# Patient Record
Sex: FEMALE | Race: WHITE | Employment: UNEMPLOYED | URBAN - METROPOLITAN AREA
[De-identification: names, ages, dates, MRNs, and addresses within clinical notes are randomized per-mention and may not be internally consistent; named-entity substitution may affect disease eponyms.]

---

## 2017-02-08 PROBLEM — Z31.69 ENCOUNTER FOR PRECONCEPTION CONSULTATION: Status: ACTIVE | Noted: 2017-02-08

## 2017-02-08 PROBLEM — E66.01 OBESITY, CLASS III, BMI 40-49.9 (MORBID OBESITY) (HCC): Status: ACTIVE | Noted: 2017-02-08

## 2017-02-08 PROBLEM — Z31.69 INFERTILITY COUNSELING: Status: ACTIVE | Noted: 2017-02-08

## 2017-03-31 PROBLEM — N93.9 ABNORMAL UTERINE BLEEDING (AUB): Status: ACTIVE | Noted: 2017-03-31

## 2018-04-07 ENCOUNTER — HOSPITAL ENCOUNTER (EMERGENCY)
Age: 33
Discharge: HOME | End: 2018-04-07
Attending: EMERGENCY MEDICINE
Payer: COMMERCIAL

## 2018-04-07 VITALS
HEART RATE: 110 BPM | SYSTOLIC BLOOD PRESSURE: 188 MMHG | TEMPERATURE: 98.2 F | BODY MASS INDEX: 45.31 KG/M2 | DIASTOLIC BLOOD PRESSURE: 103 MMHG | WEIGHT: 240 LBS | OXYGEN SATURATION: 97 % | RESPIRATION RATE: 16 BRPM | HEIGHT: 61 IN

## 2018-04-07 DIAGNOSIS — G89.29 ACUTE EXACERBATION OF CHRONIC LOW BACK PAIN: Primary | ICD-10-CM

## 2018-04-07 DIAGNOSIS — M54.50 ACUTE EXACERBATION OF CHRONIC LOW BACK PAIN: Primary | ICD-10-CM

## 2018-04-07 LAB
BACTERIA URNS QL MICRO: 0 /HPF
CASTS URNS QL MICRO: 0 /LPF
CRYSTALS URNS QL MICRO: NORMAL /LPF
EPI CELLS #/AREA URNS HPF: NORMAL /HPF
HCG SERPL-ACNC: 2013 MIU/ML (ref 0–6)
HCG UR QL: POSITIVE
MUCOUS THREADS URNS QL MICRO: NORMAL /LPF
OTHER OBSERVATIONS,UCOM: NORMAL
RBC #/AREA URNS HPF: NORMAL /HPF
WBC URNS QL MICRO: NORMAL /HPF

## 2018-04-07 PROCEDURE — 84702 CHORIONIC GONADOTROPIN TEST: CPT | Performed by: EMERGENCY MEDICINE

## 2018-04-07 PROCEDURE — 99284 EMERGENCY DEPT VISIT MOD MDM: CPT | Performed by: EMERGENCY MEDICINE

## 2018-04-07 PROCEDURE — 81003 URINALYSIS AUTO W/O SCOPE: CPT | Performed by: EMERGENCY MEDICINE

## 2018-04-07 PROCEDURE — 81025 URINE PREGNANCY TEST: CPT

## 2018-04-07 PROCEDURE — 81015 MICROSCOPIC EXAM OF URINE: CPT | Performed by: EMERGENCY MEDICINE

## 2018-04-07 NOTE — ED TRIAGE NOTES
PMD-[facility]. Pt c/o lower back pain since yesterday morning. Pt states that she bent over to wash her hair and when she raised up she felt pain. Hx of L5-S1 surgery. 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

## 2018-04-07 NOTE — ED NOTES
I have reviewed discharge instructions with the patient and spouse. The patient and spouse verbalized understanding. Patient left ED via Discharge Method: ambulatory to Home with self. Opportunity for questions and clarification provided. Patient given 0 scripts. Instructed to use icy/hot and tylenol. Instructed to follow up with OB as scheduled        To continue your aftercare when you leave the hospital, you may receive an automated call from our care team to check in on how you are doing. This is a free service and part of our promise to provide the best care and service to meet your aftercare needs.  If you have questions, or wish to unsubscribe from this service please call [phone]. Thank you for Choosing our [facility] Emergency Department.
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
## 2018-04-11 PROBLEM — M54.9 CHRONIC BACK PAIN: Status: ACTIVE | Noted: 2018-04-11

## 2018-04-11 PROBLEM — G89.29 CHRONIC BACK PAIN: Status: ACTIVE | Noted: 2018-04-11

## 2018-04-24 PROBLEM — Z31.69 ENCOUNTER FOR PRECONCEPTION CONSULTATION: Status: RESOLVED | Noted: 2017-02-08 | Resolved: 2018-04-24

## 2018-04-24 PROBLEM — Z34.90 PREGNANCY: Status: ACTIVE | Noted: 2018-04-24

## 2018-04-24 PROBLEM — D21.9 FIBROIDS: Status: ACTIVE | Noted: 2018-04-24

## 2018-04-24 PROBLEM — N93.9 ABNORMAL UTERINE BLEEDING (AUB): Status: RESOLVED | Noted: 2017-03-31 | Resolved: 2018-04-24

## 2018-04-24 PROBLEM — E28.2 PCOS (POLYCYSTIC OVARIAN SYNDROME): Status: ACTIVE | Noted: 2018-04-24

## 2018-04-24 PROBLEM — Z31.69 INFERTILITY COUNSELING: Status: RESOLVED | Noted: 2017-02-08 | Resolved: 2018-04-24

## 2018-04-24 PROBLEM — E03.9 ACQUIRED HYPOTHYROIDISM: Status: ACTIVE | Noted: 2018-04-24

## 2018-05-21 PROBLEM — Z83.49 FAMILY HISTORY OF MTHFR DEFICIENCY: Status: ACTIVE | Noted: 2018-05-21

## 2018-05-25 PROBLEM — Z36.82 NUCHAL TRANSLUCENCY OF FETUS ON PRENATAL ULTRASOUND: Status: ACTIVE | Noted: 2018-05-25

## 2018-05-25 PROBLEM — D25.9 UTERINE FIBROIDS AFFECTING PREGNANCY, ANTEPARTUM, FIRST TRIMESTER: Status: ACTIVE | Noted: 2018-04-24

## 2018-05-25 PROBLEM — Z82.79 FAMILY HISTORY OF CONGENITAL OR GENETIC CONDITION: Status: ACTIVE | Noted: 2018-05-25

## 2018-05-25 PROBLEM — O99.281 HYPOTHYROID IN PREGNANCY, ANTEPARTUM, FIRST TRIMESTER: Status: ACTIVE | Noted: 2018-04-24

## 2018-05-25 PROBLEM — O34.11 UTERINE FIBROIDS AFFECTING PREGNANCY, ANTEPARTUM, FIRST TRIMESTER: Status: ACTIVE | Noted: 2018-04-24

## 2018-05-25 PROBLEM — O99.891 BACK PAIN COMPLICATING PREGNANCY IN FIRST TRIMESTER: Status: ACTIVE | Noted: 2018-04-11

## 2018-05-25 PROBLEM — O99.211 OBESITY AFFECTING PREGNANCY IN FIRST TRIMESTER: Status: ACTIVE | Noted: 2017-02-08

## 2018-05-25 PROBLEM — O09.91 HIGH-RISK PREGNANCY IN FIRST TRIMESTER: Status: ACTIVE | Noted: 2018-04-24

## 2018-05-25 PROBLEM — O34.80 POLYCYSTIC OVARY AFFECTING PREGNANCY, ANTEPARTUM: Status: ACTIVE | Noted: 2018-04-24

## 2018-06-15 ENCOUNTER — HOSPITAL ENCOUNTER (EMERGENCY)
Age: 33
Discharge: HOME | End: 2018-06-15
Attending: EMERGENCY MEDICINE
Payer: COMMERCIAL

## 2018-06-15 VITALS
RESPIRATION RATE: 14 BRPM | OXYGEN SATURATION: 98 % | HEIGHT: 61 IN | TEMPERATURE: 98.2 F | BODY MASS INDEX: 38.71 KG/M2 | WEIGHT: 205 LBS | HEART RATE: 95 BPM | SYSTOLIC BLOOD PRESSURE: 112 MMHG | DIASTOLIC BLOOD PRESSURE: 58 MMHG

## 2018-06-15 DIAGNOSIS — R11.10 HYPEREMESIS: Primary | ICD-10-CM

## 2018-06-15 LAB
ALBUMIN SERPL-MCNC: 2.8 G/DL (ref 3.5–5)
ALBUMIN/GLOB SERPL: 0.5 {RATIO} (ref 1.2–3.5)
ALP SERPL-CCNC: 55 U/L (ref 50–136)
ALT SERPL-CCNC: 15 U/L (ref 12–65)
ANION GAP SERPL CALC-SCNC: 11 MMOL/L (ref 7–16)
AST SERPL-CCNC: 14 U/L (ref 15–37)
BASOPHILS # BLD: 0 K/UL (ref 0–0.2)
BASOPHILS NFR BLD: 0 % (ref 0–2)
BILIRUB SERPL-MCNC: 0.2 MG/DL (ref 0.2–1.1)
BUN SERPL-MCNC: 1 MG/DL (ref 6–23)
CALCIUM SERPL-MCNC: 9.5 MG/DL (ref 8.3–10.4)
CHLORIDE SERPL-SCNC: 101 MMOL/L (ref 98–107)
CO2 SERPL-SCNC: 23 MMOL/L (ref 21–32)
CREAT SERPL-MCNC: 0.61 MG/DL (ref 0.6–1)
DIFFERENTIAL METHOD BLD: ABNORMAL
EOSINOPHIL # BLD: 0.3 K/UL (ref 0–0.8)
EOSINOPHIL NFR BLD: 2 % (ref 0.5–7.8)
ERYTHROCYTE [DISTWIDTH] IN BLOOD BY AUTOMATED COUNT: 15.2 % (ref 11.9–14.6)
GLOBULIN SER CALC-MCNC: 5.1 G/DL (ref 2.3–3.5)
GLUCOSE SERPL-MCNC: 89 MG/DL (ref 65–100)
HCG UR QL: POSITIVE
HCT VFR BLD AUTO: 38.1 % (ref 35.8–46.3)
HGB BLD-MCNC: 12.1 G/DL (ref 11.7–15.4)
IMM GRANULOCYTES # BLD: 0 K/UL (ref 0–0.5)
IMM GRANULOCYTES NFR BLD AUTO: 0 % (ref 0–5)
LYMPHOCYTES # BLD: 4 K/UL (ref 0.5–4.6)
LYMPHOCYTES NFR BLD: 29 % (ref 13–44)
MCH RBC QN AUTO: 25.9 PG (ref 26.1–32.9)
MCHC RBC AUTO-ENTMCNC: 31.8 G/DL (ref 31.4–35)
MCV RBC AUTO: 81.6 FL (ref 79.6–97.8)
MONOCYTES # BLD: 0.8 K/UL (ref 0.1–1.3)
MONOCYTES NFR BLD: 6 % (ref 4–12)
NEUTS SEG # BLD: 8.4 K/UL (ref 1.7–8.2)
NEUTS SEG NFR BLD: 63 % (ref 43–78)
PLATELET # BLD AUTO: 315 K/UL (ref 150–450)
PMV BLD AUTO: 10.1 FL (ref 10.8–14.1)
POTASSIUM SERPL-SCNC: 3.8 MMOL/L (ref 3.5–5.1)
PROT SERPL-MCNC: 7.9 G/DL (ref 6.3–8.2)
RBC # BLD AUTO: 4.67 M/UL (ref 4.05–5.25)
SODIUM SERPL-SCNC: 135 MMOL/L (ref 136–145)
WBC # BLD AUTO: 13.6 K/UL (ref 4.3–11.1)

## 2018-06-15 PROCEDURE — 85025 COMPLETE CBC W/AUTO DIFF WBC: CPT | Performed by: EMERGENCY MEDICINE

## 2018-06-15 PROCEDURE — 81025 URINE PREGNANCY TEST: CPT

## 2018-06-15 PROCEDURE — 74011250636 HC RX REV CODE- 250/636: Performed by: EMERGENCY MEDICINE

## 2018-06-15 PROCEDURE — 81003 URINALYSIS AUTO W/O SCOPE: CPT | Performed by: EMERGENCY MEDICINE

## 2018-06-15 PROCEDURE — 80053 COMPREHEN METABOLIC PANEL: CPT | Performed by: EMERGENCY MEDICINE

## 2018-06-15 PROCEDURE — 99284 EMERGENCY DEPT VISIT MOD MDM: CPT | Performed by: EMERGENCY MEDICINE

## 2018-06-15 PROCEDURE — 96361 HYDRATE IV INFUSION ADD-ON: CPT | Performed by: EMERGENCY MEDICINE

## 2018-06-15 PROCEDURE — 96374 THER/PROPH/DIAG INJ IV PUSH: CPT | Performed by: EMERGENCY MEDICINE

## 2018-06-15 RX ORDER — ONDANSETRON 2 MG/ML
4 INJECTION INTRAMUSCULAR; INTRAVENOUS
Status: COMPLETED | OUTPATIENT
Start: 2018-06-15 | End: 2018-06-15

## 2018-06-15 RX ORDER — PROMETHAZINE HYDROCHLORIDE 25 MG/1
25 TABLET ORAL
Qty: 12 TAB | Refills: 0 | Status: SHIPPED | OUTPATIENT
Start: 2018-06-15 | End: 2018-06-20

## 2018-06-15 RX ADMIN — SODIUM CHLORIDE 1000 ML: 900 INJECTION, SOLUTION INTRAVENOUS at 20:43

## 2018-06-15 RX ADMIN — ONDANSETRON 4 MG: 2 INJECTION INTRAMUSCULAR; INTRAVENOUS at 20:44

## 2018-06-16 NOTE — ED PROVIDER NOTES
HPI Comments: Patient presents returned complaining of persistent nausea and vomiting and back pain. Also thinks she had a low-grade fever today as well. She is   With a history of a subchorionic hemorrhage as well and been having intermittent spotting but there is no change in the amount of bleeding at this time she has been having some intermittent suprapubic pain which she states she was told was related to round ligament pain. She has no syncope no chest pain or shortness of breath and review of systems otherwise negative. She has not noted any fetal motion as of yet with an estimated gestational age of about 14 weeks. Patient is a 29 y.o. female presenting with pregnancy problem. The history is provided by the patient. Pregnancy Problem    This is a new problem. The current episode started 12 to 24 hours ago. The problem occurs constantly. The problem has not changed since onset. The pain is associated with vomiting. The pain is located in the suprapubic region. The quality of the pain is cramping. The pain is at a severity of 6/10. The pain is moderate. Associated symptoms include nausea, vomiting, frequency and back pain. Pertinent negatives include no anorexia, no fever, no belching, no diarrhea, no hematochezia, no melena, no constipation, no hematuria, no headaches, no arthralgias and no myalgias. Nothing worsens the pain. The pain is relieved by nothing. The patient's surgical history non-contributory.        Past Medical History:   Diagnosis Date    Arthritis     joint pain to lumber nerve damage lft side    Bowel trouble     Calculus of kidney     possible kidney stones    Chronic pain     Endometriosis     Fibroids     Fractures     arms and hands    Headache     Hypothyroid     PCOS (polycystic ovarian syndrome)     Pneumonia        Past Surgical History:   Procedure Laterality Date    HX BREAST BIOPSY      HX COLONOSCOPY      HX HEENT      tonsils    HX LUMBAR DISKECTOMY L5-S1    HX TONSILLECTOMY      HX WISDOM TEETH EXTRACTION           Family History:   Problem Relation Age of Onset    Hypertension Father     Heart Disease Father     Breast Cancer Maternal Aunt     Breast Cancer Paternal Aunt     Diabetes Maternal Grandmother     Stroke Maternal Grandmother     Stroke Paternal Grandmother     Colon Cancer Paternal Grandmother     Breast Cancer Maternal Aunt     Kidney Disease Mother     Cancer Mother      Skin    Thyroid Disease Sister      hypothryoid    Suicide Brother      gun shot    Breast Cancer Other     Other Sister      MTHFR gene       Social History     Social History    Marital status:      Spouse name: N/A    Number of children: N/A    Years of education: N/A     Occupational History    Not on file. Social History Main Topics    Smoking status: Never Smoker    Smokeless tobacco: Never Used    Alcohol use No    Drug use: No    Sexual activity: Yes     Partners: Male     Birth control/ protection: None     Other Topics Concern    Not on file     Social History Narrative         ALLERGIES: Amoxicillin; Ceftin [cefuroxime axetil]; Oxycodone; Percocet [oxycodone-acetaminophen]; and Vicodin [hydrocodone-acetaminophen]    Review of Systems   Constitutional: Negative for fever. Gastrointestinal: Positive for nausea and vomiting. Negative for anorexia, constipation, diarrhea, hematochezia and melena. Genitourinary: Positive for frequency. Negative for hematuria. Musculoskeletal: Positive for back pain. Negative for arthralgias and myalgias. Neurological: Negative for headaches. All other systems reviewed and are negative. Vitals:    06/15/18 2048 06/15/18 2059 06/15/18 2115 06/15/18 2130   BP: 168/69 137/63 126/67 115/61   Pulse:       Resp:       Temp:       SpO2:       Weight:       Height:                Physical Exam   Constitutional: She is oriented to person, place, and time.  She appears well-developed and well-nourished. No distress. HENT:   Head: Normocephalic and atraumatic. Eyes: Conjunctivae and EOM are normal. Pupils are equal, round, and reactive to light. Neck: Normal range of motion. Neck supple. Cardiovascular: Normal rate, regular rhythm and normal heart sounds. Pulmonary/Chest: Effort normal and breath sounds normal.   Abdominal: Soft. Bowel sounds are normal.   Musculoskeletal: Normal range of motion. She exhibits no edema, tenderness or deformity. Neurological: She is alert and oriented to person, place, and time. Skin: Skin is warm and dry. Psychiatric: She has a normal mood and affect. Her behavior is normal.   Nursing note and vitals reviewed. MDM  Number of Diagnoses or Management Options  Diagnosis management comments: Bedside ultrasound confirms continued live intrauterine pregnancy       Amount and/or Complexity of Data Reviewed  Clinical lab tests: ordered and reviewed    Risk of Complications, Morbidity, and/or Mortality  Presenting problems: moderate  Diagnostic procedures: moderate  Management options: moderate    Patient Progress  Patient progress: stable        ED Course       Procedures      Case discussed with Dr. Timmy Velasquez was on-call for Dr. Jamel Varma. As the patient's blood pressure declined to normal level without treatment and there is no other indication of preeclampsia will treat symptomatically for the nausea and vomiting and he recommended Phenergan for further nausea vomiting control since the Diclegis the patient is taking does not seem to be working.

## 2018-06-16 NOTE — ED NOTES
I have reviewed discharge instructions with the patient. The patient verbalized understanding. Patient left ED via Discharge Method: ambulatory to Home with spouse. Opportunity for questions and clarification provided. Patient given 1 scripts. To continue your aftercare when you leave the hospital, you may receive an automated call from our care team to check in on how you are doing. This is a free service and part of our promise to provide the best care and service to meet your aftercare needs.  If you have questions, or wish to unsubscribe from this service please call [phone]. Thank you for Choosing our [facility] Emergency Department.

## 2018-07-23 PROBLEM — O99.282 HYPOTHYROID IN PREGNANCY, ANTEPARTUM, SECOND TRIMESTER: Status: ACTIVE | Noted: 2018-04-24

## 2018-07-23 PROBLEM — O09.92 HIGH-RISK PREGNANCY IN SECOND TRIMESTER: Status: ACTIVE | Noted: 2018-04-24

## 2018-07-23 PROBLEM — O99.212 OBESITY AFFECTING PREGNANCY IN SECOND TRIMESTER: Status: ACTIVE | Noted: 2017-02-08

## 2018-07-23 PROBLEM — O34.12 UTERINE FIBROIDS AFFECTING PREGNANCY, ANTEPARTUM, SECOND TRIMESTER: Status: ACTIVE | Noted: 2018-04-24

## 2018-07-31 ENCOUNTER — HOSPITAL ENCOUNTER (EMERGENCY)
Age: 33
Discharge: HOME | End: 2018-07-31
Attending: OBSTETRICS & GYNECOLOGY | Admitting: OBSTETRICS & GYNECOLOGY
Payer: COMMERCIAL

## 2018-07-31 VITALS
TEMPERATURE: 98.1 F | HEART RATE: 107 BPM | SYSTOLIC BLOOD PRESSURE: 139 MMHG | RESPIRATION RATE: 18 BRPM | BODY MASS INDEX: 47.58 KG/M2 | HEIGHT: 61 IN | DIASTOLIC BLOOD PRESSURE: 91 MMHG | WEIGHT: 252 LBS | OXYGEN SATURATION: 98 %

## 2018-07-31 DIAGNOSIS — O99.891 BACK PAIN COMPLICATING PREGNANCY IN SECOND TRIMESTER: Primary | ICD-10-CM

## 2018-07-31 DIAGNOSIS — M54.9 BACK PAIN COMPLICATING PREGNANCY IN SECOND TRIMESTER: Primary | ICD-10-CM

## 2018-07-31 PROBLEM — O26.893 ABDOMINAL PAIN DURING PREGNANCY IN THIRD TRIMESTER: Status: ACTIVE | Noted: 2018-07-31

## 2018-07-31 PROBLEM — R10.9 ABDOMINAL PAIN DURING PREGNANCY IN THIRD TRIMESTER: Status: ACTIVE | Noted: 2018-07-31

## 2018-07-31 LAB
APPEARANCE UR: ABNORMAL
BACTERIA URNS QL MICRO: 0 /HPF
BILIRUB UR QL: NEGATIVE
COLOR UR: YELLOW
EPI CELLS #/AREA URNS HPF: ABNORMAL /HPF
FIBRONECTIN FETAL VAG QL: NEGATIVE
GLUCOSE UR STRIP.AUTO-MCNC: NEGATIVE MG/DL
HGB UR QL STRIP: NEGATIVE
KETONES UR QL STRIP.AUTO: ABNORMAL MG/DL
LEUKOCYTE ESTERASE UR QL STRIP.AUTO: NEGATIVE
NITRITE UR QL STRIP.AUTO: NEGATIVE
PH UR STRIP: 5.5 [PH] (ref 5–9)
PROT UR STRIP-MCNC: ABNORMAL MG/DL
RBC #/AREA URNS HPF: ABNORMAL /HPF
SP GR UR REFRACTOMETRY: 1.02 (ref 1–1.02)
UROBILINOGEN UR QL STRIP.AUTO: 0.2 EU/DL (ref 0.2–1)
WBC URNS QL MICRO: ABNORMAL /HPF

## 2018-07-31 PROCEDURE — 75810000275 HC EMERGENCY DEPT VISIT NO LEVEL OF CARE: Performed by: OBSTETRICS & GYNECOLOGY

## 2018-07-31 PROCEDURE — 81003 URINALYSIS AUTO W/O SCOPE: CPT | Performed by: OBSTETRICS & GYNECOLOGY

## 2018-07-31 PROCEDURE — 99284 EMERGENCY DEPT VISIT MOD MDM: CPT

## 2018-07-31 PROCEDURE — 82731 ASSAY OF FETAL FIBRONECTIN: CPT | Performed by: OBSTETRICS & GYNECOLOGY

## 2018-07-31 RX ORDER — TRAMADOL HYDROCHLORIDE 50 MG/1
50 TABLET ORAL
Qty: 12 TAB | Refills: 0 | Status: SHIPPED | OUTPATIENT
Start: 2018-07-31 | End: 2018-10-23 | Stop reason: SDUPTHER

## 2018-07-31 NOTE — IP AVS SNAPSHOT
[address] 
[phone] Patient: Alessandra Steele MRN: [id number] :1985 A check abhinav indicates which time of day the medication should be taken. My Medications START taking these medications Instructions Each Dose to Equal  
 Morning Noon Evening Bedtime  
 traMADol 50 mg tablet Commonly known as:  ULTRAM  
   
Your last dose was: Your next dose is: Take 1 Tab by mouth every six (6) hours as needed for Pain. Max Daily Amount: 200 mg. Indications: Pain 50 mg CONTINUE taking these medications Instructions Each Dose to Equal  
 Morning Noon Evening Bedtime  
 aspirin delayed-release 81 mg tablet Your last dose was: Your next dose is: Take  by mouth daily. cyclobenzaprine 5 mg tablet Commonly known as:  FLEXERIL Your last dose was: Your next dose is: Take 1 Tab by mouth three (3) times daily as needed for Muscle Spasm(s). Indications: Muscle Spasm 5 mg  
    
   
   
   
  
 levothyroxine 75 mcg tablet Commonly known as:  SYNTHROID Your last dose was: Your next dose is: Take 1 Tab by mouth Daily (before breakfast). 75 mcg PNV-DHA PO Your last dose was: Your next dose is: Take  by mouth.  
     
   
   
   
  
 promethazine 25 mg tablet Commonly known as:  PHENERGAN Your last dose was: Your next dose is: Take 1 Tab by mouth every six (6) hours as needed for Nausea. 25 mg  
    
   
   
   
  
 TYLENOL 325 mg tablet Generic drug:  acetaminophen Your last dose was: Your next dose is: Take  by mouth every four (4) hours as needed for Pain. TYLENOL-CODEINE #3 300-30 mg per tablet Generic drug:  acetaminophen-codeine Your last dose was: Your next dose is: Take 1 Tab by mouth every four (4) hours as needed for Pain. 1 Tab VITAMIN D3 2,000 unit Tab Generic drug:  cholecalciferol (vitamin D3) Your last dose was: Your next dose is: Take  by mouth. ZANTAC 150 mg tablet Generic drug:  raNITIdine Your last dose was: Your next dose is: Take 150 mg by mouth two (2) times a day. 150 mg Where to Get Your Medications Information on where to get these meds will be given to you by the nurse or doctor. ! Ask your nurse or doctor about these medications  
  traMADol 50 mg tablet

## 2018-07-31 NOTE — PROGRESS NOTES
Dr Rubén Patel at the bedside and performed a SVE. Discharge discussed with pt and prescription pain medication discussed. Pt verbalizes understanding.

## 2018-07-31 NOTE — PROGRESS NOTES
Discharge instructions given: ptl precautions and what to expect at 22-26 weeks. RX given to pt by dr Jovani Gates. Pt verbalized understanding. Left unit ambulating.  at bs.

## 2018-07-31 NOTE — H&P
Chief Complaint Patient presents with  Pregnancy Problem  
 
 
29 y.o. female at 22w5d 
weeks gestation who is seen forback and abdominal pain, intermittent with no particular exacerbating/alleviating factors. Occurs randomly. Has had hx of back pain/back surgery. Has also been spotting throughout pregnancy presumably due to fibroids. No uti sx, fever, change in bm. HISTORY: 
OB History  Para Term  AB Living 1 0 0 0 0 0 SAB TAB Ectopic Molar Multiple Live Births  
0 0 0 0 0 0 # Outcome Date GA Lbr Bonifacio/2nd Weight Sex Delivery Anes PTL Lv  
1 Current History Sexual Activity  Sexual activity: Yes  Partners: Male  Birth control/ protection: None Patient's last menstrual period was 2018. Social History Social History  Marital status:  Spouse name: N/A  
 Number of children: N/A  
 Years of education: N/A Occupational History  Not on file. Social History Main Topics  Smoking status: Never Smoker  Smokeless tobacco: Never Used  Alcohol use No  
 Drug use: No  
 Sexual activity: Yes  
  Partners: Male Birth control/ protection: None Other Topics Concern  Not on file Social History Narrative Past Surgical History:  
Procedure Laterality Date  HX BREAST BIOPSY  HX COLONOSCOPY    
 HX HEENT    
 tonsils  HX LUMBAR DISKECTOMY L5-S1  
 HX TONSILLECTOMY  HX WISDOM TEETH EXTRACTION Past Medical History:  
Diagnosis Date  Arthritis   
 joint pain to lumber nerve damage lft side  Bowel trouble  Calculus of kidney   
 possible kidney stones  Chronic pain  Endometriosis  Fibroids  Fractures   
 arms and hands  Headache  Hypothyroid  PCOS (polycystic ovarian syndrome)  Pneumonia ROS: 
Negative: 
 negative 10 point ROS except as noted in HPI Positive: 
 per hpi PHYSICAL EXAM: 
Blood pressure (!) 139/91, pulse (!) 107, temperature 98.1 °F (36.7 °C), resp. rate 18, height 5' 1\" (1.549 m), weight 114.3 kg (252 lb), last menstrual period 03/01/2018, SpO2 98 %. The patient appears anxious but well, alert, oriented x 3. Appropriate affect. Lungs are clear. Heart RRR, no murmurs. Abdomen obese, soft, patient states tender but nonfocal/unremarkable response to exam, no rebound/guarding. Fundus soft and non tender Skin warm, dry, no rashes Ext no edema, DTR's normal 
 
Cervix: long/closed/high. Fetal small parts palpable in lower uterine segment Fetal Heart Rate: doppler Recent Results (from the past 24 hour(s)) URINALYSIS W/ RFLX MICROSCOPIC Collection Time: 07/31/18 12:13 PM  
Result Value Ref Range Color YELLOW Appearance CLOUDY Specific gravity 1.020 1.001 - 1.023    
 pH (UA) 5.5 5.0 - 9.0 Protein TRACE (A) NEG mg/dL Glucose NEGATIVE  NEG mg/dL Ketone TRACE (A) NEG mg/dL Bilirubin NEGATIVE  NEG Blood NEGATIVE  NEG Urobilinogen 0.2 0.2 - 1.0 EU/dL Nitrites NEGATIVE  NEG Leukocyte Esterase NEGATIVE  NEG Bacteria 0 0 /hpf  
 
ffn negative Assessment: 
29 y.o. female at 18w7d, back and abdominal pain, likely due to fibroids and fetal activity Plan: 
 
Discussed use of tylenol#3 prn. States not helpful. Will prescribe tramadol--patient states she can take. Luciana Tran MD

## 2018-07-31 NOTE — DISCHARGE INSTRUCTIONS
Labor: Care Instructions  Your Care Instructions     labor is the start of labor between 21 and 36 weeks of pregnancy. A full-term pregnancy lasts 37 to 42 weeks. In labor, the uterus contracts to open the cervix. This is the first stage of childbirth.  labor can be caused by a problem with the baby, the mother, or both. Often the cause is not known. In some cases, doctors use medicines to try to delay labor until 29 or more weeks of pregnancy. By this time, a baby has grown enough so that problems are not likely. In some cases-such as with a serious infection-it is healthier for the baby to be born early. Your treatment will depend on how far along you are in your pregnancy and on your health and your baby's health. Follow-up care is a key part of your treatment and safety. Be sure to make and go to all appointments, and call your doctor if you are having problems. It's also a good idea to know your test results and keep a list of the medicines you take. How can you care for yourself at home? · If your doctor prescribed medicines, take them exactly as directed. Call your doctor if you think you are having a problem with your medicine. · Rest until your doctor advises you about activity. He or she will tell you if you should stay in bed most of the time. You may need to arrange for  if you have young children. · Do not have sexual intercourse unless your doctor says it is safe. · Use pads, not tampons, if you have vaginal bleeding. · Make sure to drink plenty of fluids. Dehydration can lead to contractions. If you have kidney, heart, or liver disease and have to limit fluids, talk with your doctor before you increase the amount of fluids you drink. · Do not smoke or allow others to smoke around you. If you need help quitting, talk to your doctor about stop-smoking programs and medicines. These can increase your chances of quitting for good.   When should you call for help?  Call 911 anytime you think you may need emergency care. For example, call if:    · You passed out (lost consciousness).     · You have severe vaginal bleeding.     · You have severe pain in your belly or pelvis.     · You have had fluid gushing or leaking from your vagina and you know or think the umbilical cord is bulging into your vagina. If this happens, immediately get down on your knees so your rear end (buttocks) is higher than your head. This will decrease the pressure on the cord until help arrives.   [facility] your doctor now or seek immediate medical care if:    · You have signs of preeclampsia, such as:  ¨ Sudden swelling of your face, hands, or feet. ¨ New vision problems (such as dimness or blurring). ¨ A severe headache.     · You have any vaginal bleeding.     · You have belly pain or cramping.     · You have a fever.     · You have had regular contractions (with or without pain) for an hour. This means that you have 6 or more within 1 hour after you change your position and drink fluids.     · You have a sudden release of fluid from the vagina.     · You have low back pain or pelvic pressure that does not go away.     · You notice that your baby has stopped moving or is moving much less than normal.    Watch closely for changes in your health, and be sure to contact your doctor if you have any problems. Where can you learn more? Go to http://abimael-stefano.info/. Enter Q400 in the search box to learn more about \" Labor: Care Instructions. \"  Current as of: 2017  Content Version: 11.7  © 8280-2290 [employer]. Care instructions adapted under license by [employer] (which disclaims liability or warranty for this information). If you have questions about a medical condition or this instruction, always ask your healthcare professional. [address] any warranty or liability for your use of this information. Weeks 22 to 26 of Your Pregnancy: Care Instructions  Your Care Instructions    As you enter your 7th month of pregnancy at week 26, your baby's lungs are growing stronger and getting ready to breathe. You may notice that your baby responds to the sound of your or your partner's voice. You may also notice that your baby does less turning and twisting and more squirming or jerking. Jerking often means that your baby has the hiccups. Hiccups are perfectly normal and are only temporary. You may want to think about attending a childbirth preparation class. This is also a good time to start thinking about whether you want to have pain medicine during labor. Most pregnant women are tested for gestational diabetes between weeks 25 and 28. Gestational diabetes occurs when your blood sugar level gets too high when you're pregnant. The test is important, because you can have gestational diabetes and not know it. But the condition can cause problems for your baby. Follow-up care is a key part of your treatment and safety. Be sure to make and go to all appointments, and call your doctor if you are having problems. It's also a good idea to know your test results and keep a list of the medicines you take. How can you care for yourself at home? Ease discomfort from your baby's kicking  · Change your position. Sometimes this will cause your baby to change position too. · Take a deep breath while you raise your arm over your head. Then breathe out while you drop your arm. Do Kegel exercises to prevent urine from leaking  · You can do Kegel exercises while you stand or sit. ¨ Squeeze the same muscles you would use to stop your urine. Your belly and thighs should not move. ¨ Hold the squeeze for 3 seconds, and then relax for 3 seconds. ¨ Start with 3 seconds. Then add 1 second each week until you are able to squeeze for 10 seconds. ¨ Repeat the exercise 10 to 15 times for each session.  Do three or more sessions each day.  Ease or reduce swelling in your feet, ankles, hands, and fingers  · If your fingers are puffy, take off your rings. · Do not eat high-salt foods, such as potato chips. · Prop up your feet on a stool or couch as much as possible. Sleep with pillows under your feet. · Do not stand for long periods of time or wear tight shoes. · Wear support stockings. Where can you learn more? Go to http://abimael-stefano.info/. Enter G264 in the search box to learn more about \"Weeks 22 to 26 of Your Pregnancy: Care Instructions. \"  Current as of: November 21, 2017  Content Version: 11.7  © 5240-6038 [employer]. Care instructions adapted under license by [employer] (which disclaims liability or warranty for this information). If you have questions about a medical condition or this instruction, always ask your healthcare professional. [place] [address] any warranty or liability for your use of this information.

## 2018-07-31 NOTE — ED TRIAGE NOTES
Pt states she is 22 weeks pregnant and having pain in abd. States she went to [facility] and sat in the lobby for two hours and then they told her to come to the ER for evaluation.

## 2018-07-31 NOTE — IP AVS SNAPSHOT
[address] 
[phone] Patient: Jennifer French MRN: [id number] :1985 About your hospitalization You were admitted on:  N/A You last received care in the:  E 4 ANTEPARTUM You were discharged on:  2018 Why you were hospitalized Your primary diagnosis was:  Not on File Your diagnoses also included:  Abdominal Pain During Pregnancy In Third Trimester Follow-up Information Follow up With Details Comments Contact Info Provider Unknown   Patient not available to ask Your Scheduled Appointments   4:00 PM EDT  
OB VISIT with Jaime Saldana MD  
[facility] ([place] [address]) [address] [place] [address] [phone] 2018  3:00 PM EDT  
GROWTH FOLLOW UP MFM ECHO with [facility] ULTRASOUND 2  
[address] ([address] [place] [address]) [address] [place] [phone] 2018  3:45 PM EDT  
OB VISIT with Lucía Eaton MD  
[address] ([address] [place] [address] Street) [address] [place] [phone] Discharge Orders None A check abhinav indicates which time of day the medication should be taken. My Medications START taking these medications Instructions Each Dose to Equal  
 Morning Noon Evening Bedtime  
 traMADol 50 mg tablet Commonly known as:  ULTRAM  
   
Your last dose was: Your next dose is: Take 1 Tab by mouth every six (6) hours as needed for Pain. Max Daily Amount: 200 mg. Indications: Pain 50 mg CONTINUE taking these medications Instructions Each Dose to Equal  
 Morning Noon Evening Bedtime  
 aspirin delayed-release 81 mg tablet Your last dose was: Your next dose is: Take  by mouth daily. cyclobenzaprine 5 mg tablet Commonly known as:  FLEXERIL Your last dose was: Your next dose is: Take 1 Tab by mouth three (3) times daily as needed for Muscle Spasm(s). Indications: Muscle Spasm 5 mg  
    
   
   
   
  
 levothyroxine 75 mcg tablet Commonly known as:  SYNTHROID Your last dose was: Your next dose is: Take 1 Tab by mouth Daily (before breakfast). 75 mcg PNV-DHA PO Your last dose was: Your next dose is: Take  by mouth.  
     
   
   
   
  
 promethazine 25 mg tablet Commonly known as:  PHENERGAN Your last dose was: Your next dose is: Take 1 Tab by mouth every six (6) hours as needed for Nausea. 25 mg  
    
   
   
   
  
 TYLENOL 325 mg tablet Generic drug:  acetaminophen Your last dose was: Your next dose is: Take  by mouth every four (4) hours as needed for Pain. TYLENOL-CODEINE #3 300-30 mg per tablet Generic drug:  acetaminophen-codeine Your last dose was: Your next dose is: Take 1 Tab by mouth every four (4) hours as needed for Pain. 1 Tab VITAMIN D3 2,000 unit Tab Generic drug:  cholecalciferol (vitamin D3) Your last dose was: Your next dose is: Take  by mouth. ZANTAC 150 mg tablet Generic drug:  raNITIdine Your last dose was: Your next dose is: Take 150 mg by mouth two (2) times a day. 150 mg Where to Get Your Medications Information on where to get these meds will be given to you by the nurse or doctor. ! Ask your nurse or doctor about these medications  
  traMADol 50 mg tablet Opioid Education Prescription Opioids: What You Need to Know: 
 
Prescription opioids can be used to help relieve moderate-to-severe pain and are often prescribed following a surgery or injury, or for certain health conditions. These medications can be an important part of treatment but also come with serious risks. Opioids are strong pain medicines. Examples include hydrocodone, oxycodone, fentanyl, and morphine. Heroin is an example of an illegal opioid. It is important to work with your health care provider to make sure you are getting the safest, most effective care. WHAT ARE THE RISKS AND SIDE EFFECTS OF OPIOID USE? Prescription opioids carry serious risks of addiction and overdose, especially with prolonged use. An opioid overdose, often marked by slow breathing, can cause sudden death. The use of prescription opioids can have a number of side effects as well, even when taken as directed. · Tolerance-meaning you might need to take more of a medication for the same pain relief · Physical dependence-meaning you have symptoms of withdrawal when the medication is stopped. Withdrawal symptoms can include nausea, sweating, chills, diarrhea, stomach cramps, and muscle aches. Withdrawal can last up to several weeks, depending on which drug you took and how long you took it. · Increased sensitivity to pain · Constipation · Nausea, vomiting, and dry mouth · Sleepiness and dizziness · Confusion · Depression · Low levels of testosterone that can result in lower sex drive, energy, and strength · Itching and sweating RISKS ARE GREATER WITH:      
· History of drug misuse, substance use disorder, or overdose · Mental health conditions (such as depression or anxiety) · Sleep apnea · Older age (72 years or older) · Pregnancy Avoid alcohol while taking prescription opioids. Also, unless specifically advised by your health care provider, medications to avoid include: · Benzodiazepines (such as Xanax or Valium) · Muscle relaxants (such as Soma or Flexeril) · Hypnotics (such as Ambien or Lunesta) · Other prescription opioids KNOW YOUR OPTIONS Talk to your health care provider about ways to manage your pain that don't involve prescription opioids. Some of these options may actually work better and have fewer risks and side effects. Options may include: 
· Pain relievers such as acetaminophen, ibuprofen, and naproxen · Some medications that are also used for depression or seizures · Physical therapy and exercise · Counseling to help patients learn how to cope better with triggers of pain and stress. · Application of heat or cold compress · Massage therapy · Relaxation techniques Be Informed Make sure you know the name of your medication, how much and how often to take it, and its potential risks & side effects. IF YOU ARE PRESCRIBED OPIOIDS FOR PAIN: 
· Never take opioids in greater amounts or more often than prescribed. Remember the goal is not to be pain-free but to manage your pain at a tolerable level. · Follow up with your primary care provider to: · Work together to create a plan on how to manage your pain. · Talk about ways to help manage your pain that don't involve prescription opioids. · Talk about any and all concerns and side effects. · Help prevent misuse and abuse. · Never sell or share prescription opioids · Help prevent misuse and abuse. · Store prescription opioids in a secure place and out of reach of others (this may include visitors, children, friends, and family). · Safely dispose of unused/unwanted prescription opioids: Find your community drug take-back program or your pharmacy mail-back program, or flush them down the toilet, following guidance from the Food and Drug Administration (www.fda.gov/Drugs/ResourcesForYou). · Visit www.cdc.gov/drugoverdose to learn about the risks of opioid abuse and overdose. · If you believe you may be struggling with addiction, tell your health care provider and ask for guidance or call [employer] at 3-159-207-BMTP. Discharge Instructions  Labor: Care Instructions Your Care Instructions  labor is the start of labor between 20 and 36 weeks of pregnancy. A full-term pregnancy lasts 37 to 42 weeks. In labor, the uterus contracts to open the cervix. This is the first stage of childbirth.  labor can be caused by a problem with the baby, the mother, or both. Often the cause is not known. In some cases, doctors use medicines to try to delay labor until 29 or more weeks of pregnancy. By this time, a baby has grown enough so that problems are not likely. In some cases-such as with a serious infection-it is healthier for the baby to be born early. Your treatment will depend on how far along you are in your pregnancy and on your health and your baby's health. Follow-up care is a key part of your treatment and safety. Be sure to make and go to all appointments, and call your doctor if you are having problems. It's also a good idea to know your test results and keep a list of the medicines you take. How can you care for yourself at home? · If your doctor prescribed medicines, take them exactly as directed. Call your doctor if you think you are having a problem with your medicine. · Rest until your doctor advises you about activity. He or she will tell you if you should stay in bed most of the time. You may need to arrange for  if you have young children. · Do not have sexual intercourse unless your doctor says it is safe. · Use pads, not tampons, if you have vaginal bleeding. · Make sure to drink plenty of fluids. Dehydration can lead to contractions.  If you have kidney, heart, or liver disease and have to limit fluids, talk with your doctor before you increase the amount of fluids you drink. · Do not smoke or allow others to smoke around you. If you need help quitting, talk to your doctor about stop-smoking programs and medicines. These can increase your chances of quitting for good. When should you call for help? Call 911 anytime you think you may need emergency care. For example, call if: 
  · You passed out (lost consciousness).  
  · You have severe vaginal bleeding.  
  · You have severe pain in your belly or pelvis.  
  · You have had fluid gushing or leaking from your vagina and you know or think the umbilical cord is bulging into your vagina. If this happens, immediately get down on your knees so your rear end (buttocks) is higher than your head. This will decrease the pressure on the cord until help arrives.  
[facility] your doctor now or seek immediate medical care if: 
  · You have signs of preeclampsia, such as: 
¨ Sudden swelling of your face, hands, or feet. ¨ New vision problems (such as dimness or blurring). ¨ A severe headache.  
  · You have any vaginal bleeding.  
  · You have belly pain or cramping.  
  · You have a fever.  
  · You have had regular contractions (with or without pain) for an hour. This means that you have 6 or more within 1 hour after you change your position and drink fluids.  
  · You have a sudden release of fluid from the vagina.  
  · You have low back pain or pelvic pressure that does not go away.  
  · You notice that your baby has stopped moving or is moving much less than normal.  
 Watch closely for changes in your health, and be sure to contact your doctor if you have any problems. Where can you learn more? Go to http://abimael-stefano.info/. Enter Q400 in the search box to learn more about \" Labor: Care Instructions. \" Current as of: 2017 Content Version: 11.7 © 7011-2640 Healthwise, Sc[employer]l. Care instructions adapted under license by [employer] (which disclaims liability or warranty for this information). If you have questions about a medical condition or this instruction, always ask your healthcare professional. [address] any warranty or liability for your use of this information. Weeks 22 to 26 of Your Pregnancy: Care Instructions Your Care Instructions As you enter your 7th month of pregnancy at week 26, your baby's lungs are growing stronger and getting ready to breathe. You may notice that your baby responds to the sound of your or your partner's voice. You may also notice that your baby does less turning and twisting and more squirming or jerking. Jerking often means that your baby has the hiccups. Hiccups are perfectly normal and are only temporary. You may want to think about attending a childbirth preparation class. This is also a good time to start thinking about whether you want to have pain medicine during labor. Most pregnant women are tested for gestational diabetes between weeks 25 and 28. Gestational diabetes occurs when your blood sugar level gets too high when you're pregnant. The test is important, because you can have gestational diabetes and not know it. But the condition can cause problems for your baby. Follow-up care is a key part of your treatment and safety. Be sure to make and go to all appointments, and call your doctor if you are having problems. It's also a good idea to know your test results and keep a list of the medicines you take. How can you care for yourself at home? Ease discomfort from your baby's kicking · Change your position. Sometimes this will cause your baby to change position too. · Take a deep breath while you raise your arm over your head. Then breathe out while you drop your arm. Do Kegel exercises to prevent urine from leaking · You can do Kegel exercises while you stand or sit. ¨ Squeeze the same muscles you would use to stop your urine. Your belly and thighs should not move. ¨ Hold the squeeze for 3 seconds, and then relax for 3 seconds. ¨ Start with 3 seconds. Then add 1 second each week until you are able to squeeze for 10 seconds. ¨ Repeat the exercise 10 to 15 times for each session. Do three or more sessions each day. Ease or reduce swelling in your feet, ankles, hands, and fingers · If your fingers are puffy, take off your rings. · Do not eat high-salt foods, such as potato chips. · Prop up your feet on a stool or couch as much as possible. Sleep with pillows under your feet. · Do not stand for long periods of time or wear tight shoes. · Wear support stockings. Where can you learn more? Go to http://abimael-stefano.info/. Enter G264 in the search box to learn more about \"Weeks 22 to 26 of Your Pregnancy: Care Instructions. \" Current as of: November 21, 2017 Content Version: 11.7 © 1362-1876 When You Wish. Care instructions adapted under license by [employer] (which disclaims liability or warranty for this information). If you have questions about a medical condition or this instruction, always ask your healthcare professional. [address] any warranty or liability for your use of this information. Introducing [facility]! Dear Alexus Bravo: Thank you for requesting a [employer] account. Our records indicate that you already have an active [employer] account. You can access your account anytime at [email or web address] Did you know that you can access your hospital and ER discharge instructions at any time in [employer]? You can also review all of your test results from your hospital stay or ER visit. Additional Information If you have questions, please visit the Frequently Asked Questions section of the [employer] website at [email or web address] Remember, [employer] is NOT to be used for urgent needs. For medical emergencies, dial 911. Now available from your iPhone and Android! Introducing Quinton Bah As a [facility] patient, I wanted to make you aware of our electronic visit tool called Quinton Bah. Stemnion 24/Webflow allows you to connect within minutes with a medical provider 24 hours a day, seven days a week via a mobile device or tablet or logging into a secure website from your computer. You can access Quinton Bah from anywhere in the [place]. A virtual visit might be right for you when you have a simple condition and feel like you just dont want to get out of bed, or cant get away from work for an appointment, when your regular [facility] provider is not available (evenings, weekends or holidays), or when youre out of town and need minor care. Electronic visits cost only $49 and if the [facility] CiDRA/Webflow provider determines a prescription is needed to treat your condition, one can be electronically transmitted to a nearby pharmacy*. Please take a moment to enroll today if you have not already done so. The enrollment process is free and takes just a few minutes. To enroll, please download the [employer]/Webflow aamir to your tablet or phone, or visit [email or web address] ([email or web address] to enroll on your computer. And, as an [address] [place] [address] patient with a [employer] account, the results of your visits will be scanned into your electronic medical record and your primary care provider will be able to view the scanned results. We urge you to continue to see your regular [facility] provider for your ongoing medical care. And while your primary care provider may not be the one available when you seek a Quinton Bah virtual visit, the peace of mind you get from getting a real diagnosis real time can be priceless. For more information on Quinton Bah, view our Frequently Asked Questions (FAQs) at [email or web address]. Sincerely, 
 
Valentino Milks, MD 
Chief Medical Officer Lachelle Mcgrath *:  certain medications cannot be prescribed via Quinton Bah Providers Seen During Your Hospitalization Provider Specialty Primary office phone Josefa Morales MD Obstetrics & Gynecology [phone] Your Primary Care Physician (PCP) Primary Care Physician Office Phone Office Fax UNKNOWN, PROVIDER ** None ** ** None ** You are allergic to the following Allergen Reactions Latex Hives Amoxicillin Hives Swelling Ceftin (Cefuroxime Axetil) Hives Swelling Oxycodone Hives Swelling Percocet (Oxycodone-Acetaminophen) Hives Swelling Vicodin (Hydrocodone-Acetaminophen) Hives Swelling Recent Documentation Height Weight BMI OB Status Smoking Status 1.549 m 114.3 kg 47.61 kg/m2 Pregnant Never Smoker Emergency Contacts Name Discharge Info Relation Home Work Mobile Magali Sepulveda  Spouse [3] [phone] Patient Belongings The following personal items are in your possession at time of discharge: 
                             
 
  
  
 Please provide this summary of care documentation to your next provider. Signatures-by signing, you are acknowledging that this After Visit Summary has been reviewed with you and you have received a copy. Patient Signature:  ____________________________________________________________ Date:  ____________________________________________________________  
  
Kristin Yanez Provider Signature:  ____________________________________________________________ Date:  ____________________________________________________________

## 2018-07-31 NOTE — IP AVS SNAPSHOT
Summary of Care Report The Summary of Care report has been created to help improve care coordination. Users with access to [employer] or [email or web address] [facility] (Web-based application) may access additional patient information including the Discharge Summary. If you are not currently a [address] user and need more information, please call the number listed below in the [place] [address] section and ask to be connected with Medical Records. Facility Information Name Address Phone [id number] [place] [address] [address] [place] [id number] [phone] Patient Information Patient Name Sex  Naresh Thomas ([id number]) Female 1985 Discharge Information Admitting Provider Service Area Unit Melo Alcantar MD / [address] 4 Antepartum / [phone] Discharge Provider Discharge Date/Time Discharge Disposition Destination (none) 2018 Midday (Pending) AHR (none) Patient Language Language ENGLISH [13] Hospital Problems as of 2018  Reviewed: 2018 11:29 AM by Melo Alcantar MD  
  
  
  
 Class Noted - Resolved Last Modified POA Active Problems Abdominal pain during pregnancy in third trimester  2018 - Present 2018 by Melo Alcantar MD Unknown Entered by Melo Alcantar MD  
  
Non-Hospital Problems as of 2018  Reviewed: 2018 11:29 AM by Melo Alcantar MD  
  
  
  
 Class Noted - Resolved Last Modified Active Problems Obesity affecting pregnancy in second trimester  2017 - Present 2018 by Lilian Breen Entered by Taj Sena DO Overview Signed 2018  2:48 PM by Barbara Garcia RN Glucola-  
18 weeks- 
 
28 weeks Will start ASA 81mg x2 and Vit D 2000iu at 12 weeks. Will d/c ASA at 36 weeks Back pain complicating pregnancy in second trimester  4/11/2018 - Present 7/23/2018 by Rg Bautista Entered by Estela Sena DO Overview Addendum 5/25/2018  9:40 AM by Sana Valdez MD  
   Sees Dr Saeed Lakhani at [facility] in [facility], trying to get her referred to pain management in [place]. H/o Lumbar diskectomy- L5-S1 ? anesthesia consult 5/25/2018 at [facility]:  Pt being managed by [facility]; but per patient, Dr. Dyllan Cook will prescribe medications while pregnant. Currently taking Flexeril and Tylenol #3. Next follow up with [facility] is 6/6. · Defer management to [facility]. · Recommend anesthesia PAT at ~32 weeks; patient counseled to bring MRI records with her to that appt. Polycystic ovary affecting pregnancy, antepartum  4/24/2018 - Present 7/23/2018 by Rg Bautista Entered by Migue Mars RN Overview Addendum 7/23/2018  3:00 PM by Rg Bautista Glucola- 
18 weeks-  
28 weeks- 
 
7/23/2018 at [facility]: Manus Areli testing done 7/3/2018: 107 Hypothyroid in pregnancy, antepartum, second trimester  4/24/2018 - Present 7/23/2018 by Sana Valdez MD  
  Entered by Migue Mars RN Overview Addendum 7/23/2018  5:00 PM by Sana Valdez MD  
   04/24/18-Not taking any medications (sees Dr Elvira Cruz) Tsh/Free T4 q trimester 4/24/2018 TSH/Free T4- TSH 4.070, free T4 1.13 (ok per Dr Lyle Esqueda) 5/25/2018 at [facility]:  Currently taking Synthroid 75 mcg; has been on this dose x 2 days. Prior to that was doing holistic treatment, \"prior to that was on Synthroid, but it didn't really help\". 7/23/2018 at [facility]: Continues taking Synthroid 75 mcg daily. · Recommend evaluation of TSH and FT4 each trimester. Goal to keep TSH <2-2.5. · If medication change, repeat TFTs in 4 weeks. · If poorly controlled thyroid, recommend growth evaluation in 3rd trimester. High-risk pregnancy in second trimester  4/24/2018 - Present 7/23/2018 by Neda Whitehead Entered by Destini Burnette RN Overview Addendum 7/23/2018  3:58 PM by Neda Whitehead Genetics-referral to MFM done 7/23/2018 at [facility]: Pt referred for genetic counseling for genetic counselor. Uterine fibroids affecting pregnancy, antepartum, second trimester  4/24/2018 - Present 7/23/2018 by Michelle Morris MD  
  Entered by Destini Burnette RN Overview Addendum 7/23/2018  5:01 PM by Michelle Morris MD  
   2 fibroids seen at EOB ultrasound 5/25/2018 [facility]: fibroids noted; one large 7cm at uterocervix junction. 7/23/2018 at [facility]: AC 18%, YINKA WNL CL 3.7 cm. Large fibroid 7.1 x 8.6 x 7.8 and small fibroid 3.6 x 2.8 x 2.7 Patient still c/o cramping and some spotting bleeding due to fibroids · Return MFM in 4-6 weeks for anatomy completion and evaluation · Fibroid degeneration precautions. 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  
variants (one copy of each). Per Dr Gato Wadsworth will need fasting homocysteine level checked at next visit 06/20/18 · Counseled today no need to take folate rather than folic acid especially without fasting hyperhomocystenemia, but no harm if she desires. · Not associated with poor prenatal outcomes. 06/20/18 Fasting Homocysteine 4.2   Nuchal translucency of fetus on prenatal ultrasound  5/25/2018 - Present 7/23/2018 by Michelle Morris MD  
 Entered by Julio Campbell RN Overview Addendum 7/23/2018  5:00 PM by Gabrielle Mix MD  
   5/25/2018 at [facility]:  Normal NT, NB present. Genetic testing done by physician today. Pt wants NIPT and carrier screening (CF/SMA/FX/DMD); Panorama/Horizon drawn today---> Esequiel Negative 6/5/2018-[facility]:  Panorama came back as \"insufficient fetal DNA\" and Horizon carrier screen is still processing. Pt to call [employer] for mobile phlebotomist to be sent to her. Contact info provided to pt. 
7/23/2018 [facility]: Discussion of insufficient DNA on 12wk NIPT- pt decided not to get redrawn. Anatomy limited but without evidence of T13/18 today. No obvious T21 markers. Family history of congenital or genetic condition  5/25/2018 - Present 5/25/2018 by Gabrielle Mix MD  
  Entered by Gabrielle Mix MD  
  Overview Signed 5/25/2018  9:45 AM by Gabrielle Mix MD  
   5/25/2018 [facility]: FOB brother with balanced translocation and had associated second trimester demise Counseled re low likelihood of impact; however will have patient and spouse meet with genetic counselor at next visit. Spousal karyotype if desires further work up. You are allergic to the following Allergen Reactions Latex Hives Amoxicillin Hives Swelling Ceftin (Cefuroxime Axetil) Hives Swelling Oxycodone Hives Swelling Percocet (Oxycodone-Acetaminophen) Hives Swelling Vicodin (Hydrocodone-Acetaminophen) Hives Swelling Current Discharge Medication List  
  
START taking these medications Dose & Instructions Dispensing Information Comments  
 traMADol 50 mg tablet Commonly known as:  ULTRAM  
 Dose:  50 mg Take 1 Tab by mouth every six (6) hours as needed for Pain. Max Daily Amount: 200 mg. Indications: Pain Quantity:  12 Tab Refills:  0 CONTINUE these medications which have NOT CHANGED Dose & Instructions Dispensing Information Comments aspirin delayed-release 81 mg tablet Take  by mouth daily. Refills:  0  
   
 cyclobenzaprine 5 mg tablet Commonly known as:  FLEXERIL Dose:  5 mg Take 1 Tab by mouth three (3) times daily as needed for Muscle Spasm(s). Indications: Muscle Spasm Quantity:  40 Tab Refills:  0  
   
 levothyroxine 75 mcg tablet Commonly known as:  SYNTHROID Dose:  75 mcg Take 1 Tab by mouth Daily (before breakfast). Quantity:  30 Tab Refills:  1 PNV-DHA PO Take  by mouth. Refills:  0  
   
 promethazine 25 mg tablet Commonly known as:  PHENERGAN Dose:  25 mg Take 1 Tab by mouth every six (6) hours as needed for Nausea. Quantity:  30 Tab Refills:  5  
   
 TYLENOL 325 mg tablet Generic drug:  acetaminophen Take  by mouth every four (4) hours as needed for Pain. Refills:  0  
   
 TYLENOL-CODEINE #3 300-30 mg per tablet Generic drug:  acetaminophen-codeine Dose:  1 Tab Take 1 Tab by mouth every four (4) hours as needed for Pain. Refills:  0  
   
 VITAMIN D3 2,000 unit Tab Generic drug:  cholecalciferol (vitamin D3) Take  by mouth. Refills:  0  
   
 ZANTAC 150 mg tablet Generic drug:  raNITIdine Dose:  150 mg Take 150 mg by mouth two (2) times a day. Refills:  0 Follow-up Information Follow up With Details Comments Contact Info Provider Unknown   Patient not available to ask Discharge Instructions  Labor: Care Instructions Your Care Instructions  labor is the start of labor between 20 and 36 weeks of pregnancy. A full-term pregnancy lasts 37 to 42 weeks. In labor, the uterus contracts to open the cervix. This is the first stage of childbirth.  labor can be caused by a problem with the baby, the mother, or both. Often the cause is not known.  
In some cases, doctors use medicines to try to delay labor until 29 or more weeks of pregnancy. By this time, a baby has grown enough so that problems are not likely. In some cases-such as with a serious infection-it is healthier for the baby to be born early. Your treatment will depend on how far along you are in your pregnancy and on your health and your baby's health. Follow-up care is a key part of your treatment and safety. Be sure to make and go to all appointments, and call your doctor if you are having problems. It's also a good idea to know your test results and keep a list of the medicines you take. How can you care for yourself at home? · If your doctor prescribed medicines, take them exactly as directed. Call your doctor if you think you are having a problem with your medicine. · Rest until your doctor advises you about activity. He or she will tell you if you should stay in bed most of the time. You may need to arrange for  if you have young children. · Do not have sexual intercourse unless your doctor says it is safe. · Use pads, not tampons, if you have vaginal bleeding. · Make sure to drink plenty of fluids. Dehydration can lead to contractions. If you have kidney, heart, or liver disease and have to limit fluids, talk with your doctor before you increase the amount of fluids you drink. · Do not smoke or allow others to smoke around you. If you need help quitting, talk to your doctor about stop-smoking programs and medicines. These can increase your chances of quitting for good. When should you call for help? Call 911 anytime you think you may need emergency care. For example, call if: 
  · You passed out (lost consciousness).  
  · You have severe vaginal bleeding.  
  · You have severe pain in your belly or pelvis.  
  · You have had fluid gushing or leaking from your vagina and you know or think the umbilical cord is bulging into your vagina.  If this happens, immediately get down on your knees so your rear end (buttocks) is higher than your head. This will decrease the pressure on the cord until help arrives.  
[facility] your doctor now or seek immediate medical care if: 
  · You have signs of preeclampsia, such as: 
¨ Sudden swelling of your face, hands, or feet. ¨ New vision problems (such as dimness or blurring). ¨ A severe headache.  
  · You have any vaginal bleeding.  
  · You have belly pain or cramping.  
  · You have a fever.  
  · You have had regular contractions (with or without pain) for an hour. This means that you have 6 or more within 1 hour after you change your position and drink fluids.  
  · You have a sudden release of fluid from the vagina.  
  · You have low back pain or pelvic pressure that does not go away.  
  · You notice that your baby has stopped moving or is moving much less than normal.  
 Watch closely for changes in your health, and be sure to contact your doctor if you have any problems. Where can you learn more? Go to http://abimael-stefano.info/. Enter Q400 in the search box to learn more about \" Labor: Care Instructions. \" Current as of: 2017 Content Version: 11.7 © 3188-7967 [employer]. Care instructions adapted under license by [employer] (which disclaims liability or warranty for this information). If you have questions about a medical condition or this instruction, always ask your healthcare professional. [place] [address] any warranty or liability for your use of this information. Weeks 22 to 26 of Your Pregnancy: Care Instructions Your Care Instructions As you enter your 7th month of pregnancy at week 26, your baby's lungs are growing stronger and getting ready to breathe. You may notice that your baby responds to the sound of your or your partner's voice.  You may also notice that your baby does less turning and twisting and more squirming or jerking. Jerking often means that your baby has the hiccups. Hiccups are perfectly normal and are only temporary. You may want to think about attending a childbirth preparation class. This is also a good time to start thinking about whether you want to have pain medicine during labor. Most pregnant women are tested for gestational diabetes between weeks 25 and 28. Gestational diabetes occurs when your blood sugar level gets too high when you're pregnant. The test is important, because you can have gestational diabetes and not know it. But the condition can cause problems for your baby. Follow-up care is a key part of your treatment and safety. Be sure to make and go to all appointments, and call your doctor if you are having problems. It's also a good idea to know your test results and keep a list of the medicines you take. How can you care for yourself at home? Ease discomfort from your baby's kicking · Change your position. Sometimes this will cause your baby to change position too. · Take a deep breath while you raise your arm over your head. Then breathe out while you drop your arm. Do Kegel exercises to prevent urine from leaking · You can do Kegel exercises while you stand or sit. ¨ Squeeze the same muscles you would use to stop your urine. Your belly and thighs should not move. ¨ Hold the squeeze for 3 seconds, and then relax for 3 seconds. ¨ Start with 3 seconds. Then add 1 second each week until you are able to squeeze for 10 seconds. ¨ Repeat the exercise 10 to 15 times for each session. Do three or more sessions each day. Ease or reduce swelling in your feet, ankles, hands, and fingers · If your fingers are puffy, take off your rings. · Do not eat high-salt foods, such as potato chips. · Prop up your feet on a stool or couch as much as possible. Sleep with pillows under your feet. · Do not stand for long periods of time or wear tight shoes. · Wear support stockings. Where can you learn more? Go to http://abimael-stefano.info/. Enter G264 in the search box to learn more about \"Weeks 22 to 26 of Your Pregnancy: Care Instructions. \" Current as of: November 21, 2017 Content Version: 11.7 © 8102-2464 [employer]. Care instructions adapted under license by [employer] (which disclaims liability or warranty for this information). If you have questions about a medical condition or this instruction, always ask your healthcare professional. [place] [address] any warranty or liability for your use of this information. Chart Review Routing History Recipient Method Report Sent By Diego Earl  
 [facility] Fax: [phone] Fax [facility]SI GVL IP AMB RESULT REPORT [facility] [[id number]] 1/18/2017 10:45 AM 1/18/2017 [facility] Fax: [phone] Fax BSI GVL IP AMB RESULT REPORT [facility] [[id number]] 1/27/2017 11:48 AM 1/27/2017

## 2018-09-05 PROBLEM — R10.9 ABDOMINAL PAIN DURING PREGNANCY IN THIRD TRIMESTER: Status: RESOLVED | Noted: 2018-07-31 | Resolved: 2018-09-05

## 2018-09-05 PROBLEM — O35.2XX0 HEREDITARY DISEASE IN FAMILY POSSIBLY AFFECTING FETUS: Status: ACTIVE | Noted: 2018-05-25

## 2018-09-05 PROBLEM — O26.893 ABDOMINAL PAIN DURING PREGNANCY IN THIRD TRIMESTER: Status: RESOLVED | Noted: 2018-07-31 | Resolved: 2018-09-05

## 2018-10-03 PROBLEM — O99.213 OBESITY AFFECTING PREGNANCY IN THIRD TRIMESTER: Status: ACTIVE | Noted: 2017-02-08

## 2018-10-03 PROBLEM — O99.283 HYPOTHYROIDISM COMPLICATING PREGNANCY, THIRD TRIMESTER: Status: ACTIVE | Noted: 2018-04-24

## 2018-10-03 PROBLEM — O09.93 HIGH-RISK PREGNANCY IN THIRD TRIMESTER: Status: ACTIVE | Noted: 2018-04-24

## 2018-10-03 PROBLEM — O40.3XX0 POLYHYDRAMNIOS AFFECTING PREGNANCY IN THIRD TRIMESTER: Status: ACTIVE | Noted: 2018-10-03

## 2018-10-03 PROBLEM — O34.13 UTERINE FIBROIDS AFFECTING PREGNANCY, THIRD TRIMESTER: Status: ACTIVE | Noted: 2018-04-24

## 2018-10-24 ENCOUNTER — HOSPITAL ENCOUNTER (OUTPATIENT)
Dept: SURGERY | Age: 33
Discharge: HOME | End: 2018-10-24
Attending: OBSTETRICS & GYNECOLOGY

## 2018-11-05 ENCOUNTER — HOSPITAL ENCOUNTER (INPATIENT)
Age: 33
LOS: 15 days | Discharge: HOME | End: 2018-11-20
Attending: OBSTETRICS & GYNECOLOGY | Admitting: OBSTETRICS & GYNECOLOGY
Payer: COMMERCIAL

## 2018-11-05 DIAGNOSIS — D25.9 UTERINE FIBROIDS AFFECTING PREGNANCY, THIRD TRIMESTER: Primary | ICD-10-CM

## 2018-11-05 DIAGNOSIS — O34.13 UTERINE FIBROIDS AFFECTING PREGNANCY, THIRD TRIMESTER: Primary | ICD-10-CM

## 2018-11-05 DIAGNOSIS — O11.9 CHRONIC HYPERTENSION WITH SUPERIMPOSED PREECLAMPSIA: ICD-10-CM

## 2018-11-05 PROBLEM — O16.3 HYPERTENSION AFFECTING PREGNANCY IN THIRD TRIMESTER: Status: ACTIVE | Noted: 2018-11-05

## 2018-11-05 LAB
ANION GAP SERPL CALC-SCNC: 10 MMOL/L
BASOPHILS # BLD: 0.1 K/UL (ref 0–0.2)
BASOPHILS NFR BLD: 1 % (ref 0–2)
BUN SERPL-MCNC: 9 MG/DL (ref 6–23)
CALCIUM SERPL-MCNC: 9 MG/DL (ref 8.3–10.4)
CHLORIDE SERPL-SCNC: 106 MMOL/L (ref 98–107)
CO2 SERPL-SCNC: 23 MMOL/L (ref 21–32)
CREAT SERPL-MCNC: 0.74 MG/DL (ref 0.6–1)
CREAT UR-MCNC: 138 MG/DL
DIFFERENTIAL METHOD BLD: ABNORMAL
EOSINOPHIL # BLD: 0.3 K/UL (ref 0–0.8)
EOSINOPHIL NFR BLD: 2 % (ref 0.5–7.8)
ERYTHROCYTE [DISTWIDTH] IN BLOOD BY AUTOMATED COUNT: 15.9 %
GLUCOSE SERPL-MCNC: 109 MG/DL (ref 65–100)
HCT VFR BLD AUTO: 32.5 % (ref 35.8–46.3)
HGB BLD-MCNC: 10.4 G/DL (ref 11.7–15.4)
IMM GRANULOCYTES # BLD: 0.1 K/UL (ref 0–0.5)
IMM GRANULOCYTES NFR BLD AUTO: 1 % (ref 0–5)
LDH SERPL L TO P-CCNC: 168 U/L (ref 100–190)
LYMPHOCYTES # BLD: 2.7 K/UL (ref 0.5–4.6)
LYMPHOCYTES NFR BLD: 20 % (ref 13–44)
MCH RBC QN AUTO: 26.3 PG (ref 26.1–32.9)
MCHC RBC AUTO-ENTMCNC: 32 G/DL (ref 31.4–35)
MCV RBC AUTO: 82.3 FL (ref 79.6–97.8)
MONOCYTES # BLD: 0.9 K/UL (ref 0.1–1.3)
MONOCYTES NFR BLD: 7 % (ref 4–12)
NEUTS SEG # BLD: 9.4 K/UL (ref 1.7–8.2)
NEUTS SEG NFR BLD: 70 % (ref 43–78)
NRBC # BLD: 0 K/UL (ref 0–0.2)
PLATELET # BLD AUTO: 267 K/UL (ref 150–450)
PMV BLD AUTO: 10 FL (ref 9.4–12.3)
POTASSIUM SERPL-SCNC: 4 MMOL/L (ref 3.5–5.1)
PROT UR-MCNC: 53 MG/DL
PROT/CREAT UR-RTO: 0.4
RBC # BLD AUTO: 3.95 M/UL (ref 4.05–5.2)
SODIUM SERPL-SCNC: 139 MMOL/L (ref 136–145)
URATE SERPL-MCNC: 5 MG/DL (ref 2.6–6)
WBC # BLD AUTO: 13.4 K/UL (ref 4.3–11.1)

## 2018-11-05 PROCEDURE — 84156 ASSAY OF PROTEIN URINE: CPT

## 2018-11-05 PROCEDURE — 82570 ASSAY OF URINE CREATININE: CPT

## 2018-11-05 PROCEDURE — 85025 COMPLETE CBC W/AUTO DIFF WBC: CPT

## 2018-11-05 PROCEDURE — 84550 ASSAY OF BLOOD/URIC ACID: CPT

## 2018-11-05 PROCEDURE — 80048 BASIC METABOLIC PNL TOTAL CA: CPT

## 2018-11-05 PROCEDURE — 83615 LACTATE (LD) (LDH) ENZYME: CPT

## 2018-11-05 PROCEDURE — 74011250636 HC RX REV CODE- 250/636: Performed by: OBSTETRICS & GYNECOLOGY

## 2018-11-05 PROCEDURE — 36415 COLL VENOUS BLD VENIPUNCTURE: CPT

## 2018-11-05 PROCEDURE — 4A1HXCZ MONITORING OF PRODUCTS OF CONCEPTION, CARDIAC RATE, EXTERNAL APPROACH: ICD-10-PCS | Performed by: OBSTETRICS & GYNECOLOGY

## 2018-11-05 PROCEDURE — 65270000029 HC RM PRIVATE

## 2018-11-05 RX ORDER — BETAMETHASONE SODIUM PHOSPHATE AND BETAMETHASONE ACETATE 3; 3 MG/ML; MG/ML
12 INJECTION, SUSPENSION INTRA-ARTICULAR; INTRALESIONAL; INTRAMUSCULAR; SOFT TISSUE EVERY 24 HOURS
Status: COMPLETED | OUTPATIENT
Start: 2018-11-05 | End: 2018-11-06

## 2018-11-05 RX ADMIN — BETAMETHASONE SODIUM PHOSPHATE AND BETAMETHASONE ACETATE 12 MG: 3; 3 INJECTION, SUSPENSION INTRA-ARTICULAR; INTRALESIONAL; INTRAMUSCULAR at 19:40

## 2018-11-06 LAB
COLLECT DURATION TIME UR: 24 HR
COLLECT DURATION TIME UR: 24 HR
CREAT 24H UR-MRATE: 1280 MG/24 HR (ref 600–1800)
CREAT UR-MCNC: 32 MG/DL
PROT 24H UR-MRATE: 480 MG/24HR
PROT UR-MCNC: 12 MG/DL
SPECIMEN VOL ?TM UR: 4000 ML
SPECIMEN VOL ?TM UR: 4000 ML

## 2018-11-06 PROCEDURE — 65270000029 HC RM PRIVATE

## 2018-11-06 PROCEDURE — 76816 OB US FOLLOW-UP PER FETUS: CPT | Performed by: OBSTETRICS & GYNECOLOGY

## 2018-11-06 PROCEDURE — 76819 FETAL BIOPHYS PROFIL W/O NST: CPT | Performed by: OBSTETRICS & GYNECOLOGY

## 2018-11-06 PROCEDURE — 74011250636 HC RX REV CODE- 250/636: Performed by: OBSTETRICS & GYNECOLOGY

## 2018-11-06 PROCEDURE — 74011250637 HC RX REV CODE- 250/637: Performed by: OBSTETRICS & GYNECOLOGY

## 2018-11-06 RX ORDER — LABETALOL 100 MG/1
100 TABLET, FILM COATED ORAL 3 TIMES DAILY
Status: DISCONTINUED | OUTPATIENT
Start: 2018-11-07 | End: 2018-11-07

## 2018-11-06 RX ORDER — LABETALOL 100 MG/1
100 TABLET, FILM COATED ORAL
Status: DISCONTINUED | OUTPATIENT
Start: 2018-11-06 | End: 2018-11-06

## 2018-11-06 RX ORDER — LABETALOL 200 MG/1
200 TABLET, FILM COATED ORAL
Status: COMPLETED | OUTPATIENT
Start: 2018-11-06 | End: 2018-11-06

## 2018-11-06 RX ADMIN — LABETALOL HYDROCHLORIDE 100 MG: 100 TABLET, FILM COATED ORAL at 15:28

## 2018-11-06 RX ADMIN — BETAMETHASONE SODIUM PHOSPHATE AND BETAMETHASONE ACETATE 12 MG: 3; 3 INJECTION, SUSPENSION INTRA-ARTICULAR; INTRALESIONAL; INTRAMUSCULAR at 20:13

## 2018-11-06 RX ADMIN — LABETALOL HYDROCHLORIDE 200 MG: 200 TABLET, FILM COATED ORAL at 03:54

## 2018-11-06 NOTE — PROGRESS NOTES
Afternoon assessment complete as noted Patient denies headache visual disturbance or epigastric pain

## 2018-11-06 NOTE — PROGRESS NOTES
Notified Dr. Oni Barrow of patient's increased BP's. Telephone orders to give 200 mg Labetalol PO now x1 dose and continue to monitor.

## 2018-11-06 NOTE — PROGRESS NOTES
Morning assessment complete as noted Patient denies headache, visual disturbance, or epigastric pain Patient reports positive fetal movement Patient to call RN prn needs

## 2018-11-06 NOTE — CONSULTS
[facility] Consult Note    Chart Reviewed, discussed with Dr. Simone Carson. Ultrasound showed appropriate fetal growth, reassuring fetal status, BPP-8/8.   Final report to be scanned into [address] [place] [address] Avenue

## 2018-11-06 NOTE — PROGRESS NOTES
High Risk Obstetrics Progress Note Name: Erik Gonzales MRN: [id number]  SSN: [id number] YOB: 1985  Age: 34 y.o. Sex: female Subjective: LOS: 1 day Estimated Date of Delivery: 18 Gestational Age Today: 27w7d Patient admitted for pregnancy induced hypertension. States she does have normal fetal movement and does not have chest pain, contractions, fever, headache , nausea and vomiting, pelvic pressure, right upper quadrant pain  , shortness of breath, swelling, vaginal bleeding , vaginal leaking of fluid  and visual disturbances. Objective:  
 
Vitals:  Blood pressure 109/57, pulse (!) 106, temperature 98.1 °F (36.7 °C), resp. rate 18, last menstrual period 2018, unknown if currently breastfeeding. Temp (24hrs), Av.5 °F (36.9 °C), Min:98.1 °F (36.7 °C), Max:98.8 °F (37.1 °C) Systolic (87SFH), ACK:231 , Min:109 , Max:168 Diastolic (57AAM), AUR:31, Min:57, Max:94 Intake and Output:    
  
 
Physical Exam: 
Patient without distress. Heart: Regular rate and rhythm Lung: clear to auscultation throughout lung fields, no wheezes, no rales, no rhonchi and normal respiratory effort Back: costovertebral angle tenderness absent Abdomen: soft, nontender Fundus: soft and non tender Perineum: blood absent, amniotic fluid absent Cervical Exam: deferred Lower Extremities:  - Edema 1+ Membranes:  Intact Uterine Activity:  None Fetal Heart Rate:  Reactive Labs:  
Recent Results (from the past 36 hour(s)) AMB POC OB URINE DIP Collection Time: 18  1:50 PM  
Result Value Ref Range Glucose (UA POC) Negative Negative Protein (UA POC) Trace Negative METABOLIC PANEL, BASIC Collection Time: 18  6:56 PM  
Result Value Ref Range Sodium 139 136 - 145 mmol/L Potassium 4.0 3.5 - 5.1 mmol/L Chloride 106 98 - 107 mmol/L  
 CO2 23 21 - 32 mmol/L  Anion gap 10 mmol/L  
 Glucose 109 (H) 65 - 100 mg/dL BUN 9 6 - 23 MG/DL Creatinine 0.74 0.6 - 1.0 MG/DL  
 GFR est AA >60 >60 ml/min/1.73m2 GFR est non-AA >60 ml/min/1.73m2 Calcium 9.0 8.3 - 10.4 MG/DL  
LD Collection Time: 11/05/18  6:56 PM  
Result Value Ref Range  100 - 190 U/L  
URIC ACID Collection Time: 11/05/18  6:56 PM  
Result Value Ref Range Uric acid 5.0 2.6 - 6.0 MG/DL  
CBC WITH AUTOMATED DIFF Collection Time: 11/05/18  6:56 PM  
Result Value Ref Range WBC 13.4 (H) 4.3 - 11.1 K/uL  
 RBC 3.95 (L) 4.05 - 5.2 M/uL  
 HGB 10.4 (L) 11.7 - 15.4 g/dL HCT 32.5 (L) 35.8 - 46.3 % MCV 82.3 79.6 - 97.8 FL  
 MCH 26.3 26.1 - 32.9 PG  
 MCHC 32.0 31.4 - 35.0 g/dL  
 RDW 15.9 % PLATELET 821 769 - 644 K/uL MPV 10.0 9.4 - 12.3 FL ABSOLUTE NRBC 0.00 0.0 - 0.2 K/uL  
 DF AUTOMATED NEUTROPHILS 70 43 - 78 % LYMPHOCYTES 20 13 - 44 % MONOCYTES 7 4.0 - 12.0 % EOSINOPHILS 2 0.5 - 7.8 % BASOPHILS 1 0.0 - 2.0 % IMMATURE GRANULOCYTES 1 0.0 - 5.0 %  
 ABS. NEUTROPHILS 9.4 (H) 1.7 - 8.2 K/UL  
 ABS. LYMPHOCYTES 2.7 0.5 - 4.6 K/UL  
 ABS. MONOCYTES 0.9 0.1 - 1.3 K/UL  
 ABS. EOSINOPHILS 0.3 0.0 - 0.8 K/UL  
 ABS. BASOPHILS 0.1 0.0 - 0.2 K/UL  
 ABS. IMM. GRANS. 0.1 0.0 - 0.5 K/UL PROTEIN/CREATININE RATIO, URINE Collection Time: 11/05/18  7:14 PM  
Result Value Ref Range Protein, urine random 53 mg/dL Creatinine, urine 138.00 mg/dL Protein/Creat. urine Ratio 0.4 Assessment and Plan:  
  
Principal Problem: Hypertension affecting pregnancy in third trimester (11/5/2018) Preeclampsia:  work up ongoing. 24 hour urine pending. Check 24 hour urine for total Protein Daily Fetal monitoring with Non-stress tests Deliver for Hemolytic Anemia-Elevated Liver Enzymes- Low Platelet Count syndrome, fetal nonreasurrance or worsening maternal condition. Signed By: Chrissy Cerna MD   
 November 6, 2018

## 2018-11-06 NOTE — H&P
History & Physical 
 
Name: Sage Eastman MRN: [id number]  SSN: [id number] YOB: 1985  Age: 29 y.o. Sex: female Subjective:  
 
Reason for Admission:  Pregnancy and Preeclampsia symptoms History of Present Illness: Sage Eastman is a 29 y.o.  female with an estimated gestational age of 29w2d with Estimated Date of Delivery: 18. Patient complains of not feeling well for 2 days. Pregnancy has been complicated by chronic pain. Patient denies abdominal pain  , chest pain, contractions, fever, nausea and vomiting, right upper quadrant pain  , shortness of breath, vaginal bleeding , vaginal leaking of fluid  and visual disturbances. OB History  Para Term  AB Living 1 0 0 0 0 0 SAB TAB Ectopic Molar Multiple Live Births  
0 0 0 0 0 0 # Outcome Date GA Lbr Bonifacio/2nd Weight Sex Delivery Anes PTL Lv  
1 Current Past Medical History:  
Diagnosis Date  Arthritis   
 joint pain to lumber nerve damage lft side  Bowel trouble  Calculus of kidney   
 possible kidney stones  Chronic pain  Endometriosis  Fibroids  Fractures   
 arms and hands  Gestational hypertension  Headache  Hypertension affecting pregnancy in third trimester 2018  Hypothyroid  Infertility, female  PCOS (polycystic ovarian syndrome)  Pneumonia  Polycystic disease, ovaries Past Surgical History:  
Procedure Laterality Date  HX BREAST BIOPSY  HX COLONOSCOPY    
 HX HEENT    
 tonsils  HX LUMBAR DISKECTOMY L5-S1  
 HX TONSILLECTOMY  HX WISDOM TEETH EXTRACTION Social History Occupational History  Not on file Tobacco Use  Smoking status: Never Smoker  Smokeless tobacco: Never Used Substance and Sexual Activity  Alcohol use: No  
 Drug use: No  
 Sexual activity: Yes  
  Partners: Male Birth control/protection: None Family History Problem Relation Age of Onset  Hypertension Father  Heart Disease Father  Breast Cancer Maternal Aunt  Breast Cancer Paternal Aunt  Diabetes Maternal Grandmother  Stroke Maternal Grandmother  Stroke Paternal Grandmother  Colon Cancer Paternal Grandmother  Breast Cancer Maternal Aunt  Kidney Disease Mother  Cancer Mother Skin  Thyroid Disease Sister   
     hypothryoid  Suicide Brother   
     gun shot  Breast Cancer Other  Other Sister MTHFR gene Allergies Allergen Reactions  Latex Hives  Amoxicillin Hives and Swelling  Ceftin [Cefuroxime Axetil] Hives and Swelling  Oxycodone Hives and Swelling  Percocet [Oxycodone-Acetaminophen] Hives and Swelling  Vicodin [Hydrocodone-Acetaminophen] Hives and Swelling Prior to Admission medications Medication Sig Start Date End Date Taking? Authorizing Provider  
traMADol (ULTRAM) 50 mg tablet Take 1 Tab by mouth every six (6) hours as needed for Pain. Max Daily Amount: 200 mg. 11/1/18  Yes Sabrina Lara MD  
levothyroxine (SYNTHROID) 75 mcg tablet Take 1 Tab by mouth Daily (before breakfast). 8/10/18  Yes Patricia Booker,   
aspirin delayed-release 81 mg tablet Take  by mouth daily. Yes Provider, Historical  
promethazine (PHENERGAN) 25 mg tablet Take 1 Tab by mouth every six (6) hours as needed for Nausea. 7/3/18  Yes Ava Chavez MD  
cholecalciferol, vitamin D3, (VITAMIN D3) 2,000 unit tab Take  by mouth. Yes Provider, Historical  
raNITIdine (ZANTAC) 150 mg tablet Take 150 mg by mouth two (2) times a day. Yes Provider, Historical  
acetaminophen-codeine (TYLENOL-CODEINE #3) 300-30 mg per tablet Take 1 Tab by mouth every four (4) hours as needed for Pain. Yes Provider, Historical  
acetaminophen (TYLENOL) 325 mg tablet Take  by mouth every four (4) hours as needed for Pain.    Yes Provider, Historical  
 PRENATAL 47/IRON/FOLATE 1/DHA (PNV-DHA PO) Take  by mouth. Yes Provider, Historical  
cyclobenzaprine (FLEXERIL) 5 mg tablet Take 1 Tab by mouth three (3) times daily as needed for Muscle Spasm(s). Indications: Muscle Spasm 18  Yes Watson Sena DO Review of Systems Objective:  
 
Vitals:   
Vitals:  
 18 1840 18 1902 BP:  (!) 152/94 Pulse:  (!) 115 Resp: 20 Temp: 98.8 °F (37.1 °C) Temp (24hrs), Av.8 °F (37.1 °C), Min:98.8 °F (37.1 °C), Max:98.8 °F (37.1 °C) BP  Min: 152/94  Max: 156/89 Physical Exam 
 
Cervical Exam: Closed/Thick/High 
Uterine Activity: None Membranes: Intact Fetal Heart Rate: Reactive Labs:  
Recent Results (from the past 24 hour(s)) AMB POC OB URINE DIP Collection Time: 18  1:50 PM  
Result Value Ref Range Glucose (UA POC) Negative Negative Protein (UA POC) Trace Negative METABOLIC PANEL, BASIC Collection Time: 18  6:56 PM  
Result Value Ref Range Sodium 139 136 - 145 mmol/L Potassium 4.0 3.5 - 5.1 mmol/L Chloride 106 98 - 107 mmol/L  
 CO2 23 21 - 32 mmol/L Anion gap 10 mmol/L Glucose 109 (H) 65 - 100 mg/dL BUN 9 6 - 23 MG/DL Creatinine 0.74 0.6 - 1.0 MG/DL  
 GFR est AA >60 >60 ml/min/1.73m2 GFR est non-AA >60 ml/min/1.73m2 Calcium 9.0 8.3 - 10.4 MG/DL  
LD Collection Time: 18  6:56 PM  
Result Value Ref Range  100 - 190 U/L  
URIC ACID Collection Time: 18  6:56 PM  
Result Value Ref Range Uric acid 5.0 2.6 - 6.0 MG/DL  
CBC WITH AUTOMATED DIFF Collection Time: 18  6:56 PM  
Result Value Ref Range WBC 13.4 (H) 4.3 - 11.1 K/uL  
 RBC 3.95 (L) 4.05 - 5.2 M/uL  
 HGB 10.4 (L) 11.7 - 15.4 g/dL HCT 32.5 (L) 35.8 - 46.3 % MCV 82.3 79.6 - 97.8 FL  
 MCH 26.3 26.1 - 32.9 PG  
 MCHC 32.0 31.4 - 35.0 g/dL  
 RDW 15.9 % PLATELET 253 919 - 476 K/uL MPV 10.0 9.4 - 12.3 FL ABSOLUTE NRBC 0.00 0.0 - 0.2 K/uL  
 DF AUTOMATED NEUTROPHILS 70 43 - 78 % LYMPHOCYTES 20 13 - 44 % MONOCYTES 7 4.0 - 12.0 % EOSINOPHILS 2 0.5 - 7.8 % BASOPHILS 1 0.0 - 2.0 % IMMATURE GRANULOCYTES 1 0.0 - 5.0 %  
 ABS. NEUTROPHILS 9.4 (H) 1.7 - 8.2 K/UL  
 ABS. LYMPHOCYTES 2.7 0.5 - 4.6 K/UL  
 ABS. MONOCYTES 0.9 0.1 - 1.3 K/UL  
 ABS. EOSINOPHILS 0.3 0.0 - 0.8 K/UL  
 ABS. BASOPHILS 0.1 0.0 - 0.2 K/UL  
 ABS. IMM. GRANS. 0.1 0.0 - 0.5 K/UL PROTEIN/CREATININE RATIO, URINE Collection Time: 11/05/18  7:14 PM  
Result Value Ref Range Protein, urine random 53 mg/dL Creatinine, urine 138.00 mg/dL Protein/Creat. urine Ratio 0.4 Assessment and Plan: Active Problems: Hypertension affecting pregnancy in third trimester (11/5/2018) - Pregnancy-Induced Hypertension:  Labs OK, proteinuria may rule in PEC. BP elevated in a labile pattern. Deliver with HELLP or declining maternal or fetal condition. Signed By:  Richardson Crawford MD   
 November 5, 2018

## 2018-11-06 NOTE — PROGRESS NOTES
Care assumed. Pt resting in bed. FOB at bedside. Reviewed orders and POC with pt. Opportunity for questions provided.

## 2018-11-07 PROBLEM — O11.9 CHRONIC HYPERTENSION WITH SUPERIMPOSED PREECLAMPSIA: Status: ACTIVE | Noted: 2018-11-05

## 2018-11-07 PROCEDURE — 74011250637 HC RX REV CODE- 250/637: Performed by: OBSTETRICS & GYNECOLOGY

## 2018-11-07 PROCEDURE — 99255 IP/OBS CONSLTJ NEW/EST HI 80: CPT | Performed by: OBSTETRICS & GYNECOLOGY

## 2018-11-07 PROCEDURE — 65270000029 HC RM PRIVATE

## 2018-11-07 PROCEDURE — 59025 FETAL NON-STRESS TEST: CPT

## 2018-11-07 RX ORDER — TRAMADOL HYDROCHLORIDE 50 MG/1
50 TABLET ORAL
Status: DISCONTINUED | OUTPATIENT
Start: 2018-11-07 | End: 2018-11-18

## 2018-11-07 RX ORDER — LABETALOL 100 MG/1
100 TABLET, FILM COATED ORAL EVERY 8 HOURS
Status: DISCONTINUED | OUTPATIENT
Start: 2018-11-08 | End: 2018-11-08

## 2018-11-07 RX ADMIN — LABETALOL HYDROCHLORIDE 100 MG: 100 TABLET, FILM COATED ORAL at 01:14

## 2018-11-07 RX ADMIN — LABETALOL HYDROCHLORIDE 100 MG: 100 TABLET, FILM COATED ORAL at 17:16

## 2018-11-07 RX ADMIN — TRAMADOL HYDROCHLORIDE 50 MG: 50 TABLET, FILM COATED ORAL at 15:05

## 2018-11-07 RX ADMIN — LABETALOL HYDROCHLORIDE 100 MG: 100 TABLET, FILM COATED ORAL at 08:33

## 2018-11-07 NOTE — PROGRESS NOTES
17:16 Medication Given labetalol (NORMODYNE) tablet 100 mg -  Dose: 100 mg ; Route: Oral ; Scheduled Time: 1600  Ezequiel Narayan RN

## 2018-11-07 NOTE — PROGRESS NOTES
24 hour urine returned at 480mg of protein. Spoke with Dr. Daryl Martini. She will remain in house and be delivered with persistent severe range blood pressure, pree symptoms. Will discuss plan of care with pt tomorrow.

## 2018-11-07 NOTE — PROGRESS NOTES
High Risk Obstetrics Progress Note Name: Haylie Babcock MRN: [id number]  SSN: [id number] YOB: 1985  Age: 34 y.o. Sex: female Subjective: LOS: 2 days Estimated Date of Delivery: 18 Gestational Age Today: 26w5d Patient admitted for elevated blood pressure in physician's office . States she does have normal fetal movement and back pain which is her chronic pain and does not have abdominal pain  , contractions, right upper quadrant pain  , shortness of breath, vaginal bleeding , vaginal leaking of fluid  and visual disturbances. Objective:  
 
Vitals:  Blood pressure 132/84, pulse (!) 104, temperature 98.5 °F (36.9 °C), resp. rate 20, last menstrual period 2018, unknown if currently breastfeeding. Temp (24hrs), Av.7 °F (37.1 °C), Min:98.5 °F (36.9 °C), Max:98.9 °F (37.2 °C) Systolic (87MTF), FCT:543 , Min:132 , Max:197 Diastolic (95YOI), LZI:71, Min:64, Max:88 Intake and Output:    
  
 
Physical Exam: 
Patient without distress. Abdomen: soft, nontender Membranes:  Intact Uterine Activity:  None Fetal Heart Rate:  Reactive Labs: No results found for this or any previous visit (from the past 36 hour(s)). Assessment and Plan:  
  
Principal Problem: Hypertension affecting pregnancy in third trimester (2018) Preeclampsia:  mild  Daily Fetal monitoring with Non-stress tests Deliver for Hemolytic Anemia-Elevated Liver Enzymes- Low Platelet Count syndrome, fetal nonreasurrance or worsening maternal condition. Neonatology consult and Anes. Due to Tramadol use throughout pregnancy. Will order Tramadol BID prn as she has been taking at least one a day throughout pregnancy and has pain provider Will have stop ASA with delivery soon Compression hose Will try to get Bariatric bed Signed By: Sandy Elam MD   
 2018

## 2018-11-07 NOTE — PROGRESS NOTES
11/07/18 8586 Fetal Vital Signs Mode External  
Fetal Heart Rate 120 Fetal Activity Present Variability 6-25 BPM  
Decelerations None Accelerations Yes FHR Interpretation Category I  
RN Reviewed Strip? Yes Non Stress Test Reactive Uterine Activity Mode External  
Frequency (min) 0

## 2018-11-07 NOTE — PROGRESS NOTES
Pt offered shower, egg crate added to bed for comfort, linen changed. Gown changed, no shower at this time.

## 2018-11-07 NOTE — CONSULTS
NICU is asked by Dr. Lynda Paredes to consult this 35 y/o G1, BT A+, RI, RPR NR, HIV neg, HbsAg neg, GC/Chl neg, GBS unknown mother who has received [facility] from [facility] and who is carrying a male fetus at 28 + 6/7 weeks GA ( OSMAN 12/6/2018) whom she and her  \"Estevan\" have not yet named. Pregnancy complicated by maternal hypothyroidism (on synthroid), obesity, PCOS, uterine fibroids, chronic pain (on tramadol), and PIH/concern for evolving preeclampsia (on labetalol). There is no history of alcohol or tobacco use. Mother presented for inpatient management of hypertension and is s/p BMZ. Introduced self and role. Present for consultation were Glenis Auguste and her  ELIZABETH ZIMMERMAN. They were mostly concerned with what to expect from a NICU stay if their baby should need intensive care. From a prematurity point of view, common interventions that may be necessary in this age group are some degree of respiratory support (likely brief but may include intubation and surfactant administration), external temperature regulation (incubator), feeding support, IV fluid support, and/or phototherapy. However, given GA approaching 36 weeks, with delivery not anticipated before 37 weeks unless there is a specific maternal or fetal indication, and s/p steroids for fetal lung maturity, there is a solid chance the baby may not need NICU support for these concerns at all. One specific issue that may present is that of potential withdrawal symptoms related to synthetic opiate exposure (maternal Tramadol). Observation for several days is recommended but wouldn't necessarily have to occur in the NICU unless the baby needed pharmacologic support for same. Breastfeeding is still recommended and encouraged. NICU will attend delivery as requested and offer all indicated resuscitative measures. Defer timing and method to M. All questions answered, support offered and ongoing.  Will remain available for additional questions/concerns. Thank you for the opportunity to consult with these nice parents.       50 minutes total spent in consultation, over half in direct face time and the remainder in chart review and documentation.     --Dr. Latoya Richter

## 2018-11-08 LAB
ALBUMIN SERPL-MCNC: 2.3 G/DL (ref 3.5–5)
ALBUMIN/GLOB SERPL: 0.5 {RATIO}
ALP SERPL-CCNC: 92 U/L (ref 50–136)
ALT SERPL-CCNC: 20 U/L (ref 12–65)
ANION GAP SERPL CALC-SCNC: 11 MMOL/L
AST SERPL-CCNC: 25 U/L (ref 15–37)
BILIRUB SERPL-MCNC: 0.1 MG/DL (ref 0.2–1.1)
BUN SERPL-MCNC: 8 MG/DL (ref 6–23)
CALCIUM SERPL-MCNC: 8.5 MG/DL (ref 8.3–10.4)
CHLORIDE SERPL-SCNC: 102 MMOL/L (ref 98–107)
CO2 SERPL-SCNC: 23 MMOL/L (ref 21–32)
CREAT SERPL-MCNC: 0.68 MG/DL (ref 0.6–1)
ERYTHROCYTE [DISTWIDTH] IN BLOOD BY AUTOMATED COUNT: 16.4 %
GLOBULIN SER CALC-MCNC: 4.7 G/DL (ref 2.3–3.5)
GLUCOSE SERPL-MCNC: 98 MG/DL (ref 65–100)
HCT VFR BLD AUTO: 30.9 % (ref 35.8–46.3)
HGB BLD-MCNC: 9.3 G/DL (ref 11.7–15.4)
LDH SERPL L TO P-CCNC: 211 U/L (ref 100–190)
MCH RBC QN AUTO: 26.1 PG (ref 26.1–32.9)
MCHC RBC AUTO-ENTMCNC: 30.1 G/DL (ref 31.4–35)
MCV RBC AUTO: 86.8 FL (ref 79.6–97.8)
NRBC # BLD: 0 K/UL (ref 0–0.2)
PLATELET # BLD AUTO: 252 K/UL (ref 150–450)
PMV BLD AUTO: 9.7 FL (ref 9.4–12.3)
POTASSIUM SERPL-SCNC: 4 MMOL/L (ref 3.5–5.1)
PROT SERPL-MCNC: 7 G/DL
RBC # BLD AUTO: 3.56 M/UL (ref 4.05–5.2)
SODIUM SERPL-SCNC: 136 MMOL/L (ref 136–145)
URATE SERPL-MCNC: 4.4 MG/DL (ref 2.6–6)
WBC # BLD AUTO: 12.3 K/UL (ref 4.3–11.1)

## 2018-11-08 PROCEDURE — 86901 BLOOD TYPING SEROLOGIC RH(D): CPT

## 2018-11-08 PROCEDURE — 83615 LACTATE (LD) (LDH) ENZYME: CPT

## 2018-11-08 PROCEDURE — 84550 ASSAY OF BLOOD/URIC ACID: CPT

## 2018-11-08 PROCEDURE — 65270000029 HC RM PRIVATE

## 2018-11-08 PROCEDURE — 74011250637 HC RX REV CODE- 250/637: Performed by: OBSTETRICS & GYNECOLOGY

## 2018-11-08 PROCEDURE — 59025 FETAL NON-STRESS TEST: CPT

## 2018-11-08 PROCEDURE — 74011000250 HC RX REV CODE- 250: Performed by: OBSTETRICS & GYNECOLOGY

## 2018-11-08 PROCEDURE — 86923 COMPATIBILITY TEST ELECTRIC: CPT

## 2018-11-08 PROCEDURE — 36415 COLL VENOUS BLD VENIPUNCTURE: CPT

## 2018-11-08 PROCEDURE — 80053 COMPREHEN METABOLIC PANEL: CPT

## 2018-11-08 PROCEDURE — 85027 COMPLETE CBC AUTOMATED: CPT

## 2018-11-08 RX ORDER — POLYETHYLENE GLYCOL 3350 17 G/17G
17 POWDER, FOR SOLUTION ORAL
Status: DISCONTINUED | OUTPATIENT
Start: 2018-11-08 | End: 2018-11-20 | Stop reason: HOSPADM

## 2018-11-08 RX ORDER — LEVOTHYROXINE SODIUM 50 UG/1
75 TABLET ORAL
Status: DISCONTINUED | OUTPATIENT
Start: 2018-11-09 | End: 2018-11-20 | Stop reason: HOSPADM

## 2018-11-08 RX ORDER — LABETALOL 100 MG/1
100 TABLET, FILM COATED ORAL EVERY 8 HOURS
Status: DISCONTINUED | OUTPATIENT
Start: 2018-11-08 | End: 2018-11-19

## 2018-11-08 RX ADMIN — LABETALOL HYDROCHLORIDE 100 MG: 100 TABLET, FILM COATED ORAL at 08:47

## 2018-11-08 RX ADMIN — TRAMADOL HYDROCHLORIDE 50 MG: 50 TABLET, FILM COATED ORAL at 16:04

## 2018-11-08 RX ADMIN — LABETALOL HYDROCHLORIDE 100 MG: 100 TABLET, FILM COATED ORAL at 23:13

## 2018-11-08 RX ADMIN — LABETALOL HYDROCHLORIDE 100 MG: 100 TABLET, FILM COATED ORAL at 16:03

## 2018-11-08 RX ADMIN — LABETALOL HYDROCHLORIDE 100 MG: 100 TABLET, FILM COATED ORAL at 01:55

## 2018-11-08 RX ADMIN — POLYETHYLENE GLYCOL (3350) 17 G: 17 POWDER, FOR SOLUTION ORAL at 08:56

## 2018-11-08 NOTE — PROGRESS NOTES
Pt resting as RN enters room. VS recorded and phlebotomy now at bedside for scheduled labs. Pt denies further needs at this time

## 2018-11-08 NOTE — PROGRESS NOTES
AM assessment complete at this time per flow sheet. Vital signs stable. C/O constipation. Patient denies contractions, headache, nausea and vomiting, pelvic pressure, right upper quadrant pain, shortness of breath, vaginal bleeding, vaginal leaking of fluid and visual disturbances. Patient states positive fetal movement. Encouraged to call as needed.

## 2018-11-08 NOTE — PROGRESS NOTES
08:47 Medication Given labetalol (NORMODYNE) tablet 100 mg -  Dose: 100 mg ; Route: Oral ; Scheduled Time: 3777  Jo Ann Mohamud RN  
08:50:57 Orders Placed polyethylene glycol (MIRALAX) packet 17 g  Jean Olivera MD  
08:56 Medication Given polyethylene glycol (MIRALAX) packet 17 g -  Dose: 17 g ; Route: Oral  Jo Ann Mohamud RN

## 2018-11-08 NOTE — PROGRESS NOTES
High Risk Obstetrics Progress Note Name: Hailey Ortega MRN: [id number]  SSN: [id number] YOB: 1985  Age: 34 y.o. Sex: female Subjective: LOS: 3 days Estimated Date of Delivery: 18 Gestational Age Today: 44w0d Patient admitted for elevated blood pressure in physician's office . States she does have normal fetal movement and does not have abdominal pain  , headache , nausea and vomiting, pelvic pressure, right upper quadrant pain  , shortness of breath, swelling, vaginal bleeding , vaginal leaking of fluid  and visual disturbances. Objective:  
 
Vitals:  Blood pressure 116/56, pulse 100, temperature 98.2 °F (36.8 °C), resp. rate 18, last menstrual period 2018, SpO2 96 %, unknown if currently breastfeeding. Temp (24hrs), Av.7 °F (37.1 °C), Min:98.2 °F (36.8 °C), Max:99.1 °F (37.3 °C) Systolic (58QMS), R , Min:116 , Max:163 Diastolic (41LZS), RXK:98, Min:56, Max:85 Intake and Output:    
  
 
Physical Exam: 
Patient without distress. Membranes:  Intact Uterine Activity:  None Fetal Heart Rate:  NST pending today Labs:  
Recent Results (from the past 36 hour(s)) CBC W/O DIFF Collection Time: 18  6:05 AM  
Result Value Ref Range WBC 12.3 (H) 4.3 - 11.1 K/uL  
 RBC 3.56 (L) 4.05 - 5.2 M/uL HGB 9.3 (L) 11.7 - 15.4 g/dL HCT 30.9 (L) 35.8 - 46.3 % MCV 86.8 79.6 - 97.8 FL  
 MCH 26.1 26.1 - 32.9 PG  
 MCHC 30.1 (L) 31.4 - 35.0 g/dL  
 RDW 16.4 % PLATELET 392 614 - 450 K/uL MPV 9.7 9.4 - 12.3 FL ABSOLUTE NRBC 0.00 0.0 - 0.2 K/uL METABOLIC PANEL, COMPREHENSIVE Collection Time: 18  6:05 AM  
Result Value Ref Range Sodium 136 136 - 145 mmol/L Potassium 4.0 3.5 - 5.1 mmol/L Chloride 102 98 - 107 mmol/L  
 CO2 23 21 - 32 mmol/L Anion gap 11 mmol/L Glucose 98 65 - 100 mg/dL BUN 8 6 - 23 MG/DL  Creatinine 0.68 0.6 - 1.0 MG/DL  
 GFR est AA >60 >60 ml/min/1.73m2 GFR est non-AA >60 ml/min/1.73m2 Calcium 8.5 8.3 - 10.4 MG/DL Bilirubin, total 0.1 (L) 0.2 - 1.1 MG/DL  
 ALT (SGPT) 20 12 - 65 U/L  
 AST (SGOT) 25 15 - 37 U/L Alk. phosphatase 92 50 - 136 U/L Protein, total 7.0 g/dL Albumin 2.3 (L) 3.5 - 5.0 g/dL Globulin 4.7 (H) 2.3 - 3.5 g/dL A-G Ratio 0.5 URIC ACID Collection Time: 11/08/18  6:05 AM  
Result Value Ref Range Uric acid 4.4 2.6 - 6.0 MG/DL  
LD Collection Time: 11/08/18  6:05 AM  
Result Value Ref Range  (H) 100 - 190 U/L  
TYPE + CROSSMATCH Collection Time: 11/08/18  6:05 AM  
Result Value Ref Range Crossmatch Expiration 11/11/2018 ABO/Rh(D) A POSITIVE Antibody screen NEG Unit number [id number] Blood component type [facility] Unit division 00 Status of unit ALLOCATED Crossmatch result Compatible Unit number [id number] Blood component type [facility] Unit division 00 Status of unit ALLOCATED Crossmatch result Compatible Assessment and Plan:  
  
Principal Problem: 
  Chronic hypertension with superimposed preeclampsia (11/5/2018) Overview: 11/7/2018 [facility] Inpatient Consult-New onset hypertensition but has had  
    elevated BPs early in pregnancy. I think has component of underlying  
    chronic hypertension so safe to treat severe BPs with Labetalol as all  
    other labs normal and appropriate fetal growth yesterday, normal YINKA,  
    BPP-8/8. Meets criteria for preeclampsia by 24 hour urine, would diagnose  
    her a superimposed. Explained to her expectant management until severe  
    range BPs or Severe HA or worsening labs. Explained recommendations to  
    patient and  at length. They understand and agree. I also  
    recommended no further Tramadol as could mask symptoms. · Expectant management until 37 weeks, then delivery by induction or  
    primary C/S. · Deliver for 2 severe range BPs an hour apart or Severe HA, fetal  
    non-reassurance or HELLP syndrome. · Repeat labs in am. 
    · Get type and Cross, is at risk of PP Hemorrhage. · Chronic pain, refuses to stop taking Tramadol, will get Neonatologist  
    consultation to observe  for withdrawal. 
 
  
plan as above. No severe range pressures Signed By: Sandy Elam MD   
 2018

## 2018-11-08 NOTE — PROGRESS NOTES
16:03 Medication Given labetalol (NORMODYNE) tablet 100 mg -  Dose: 100 mg ; Route: Oral ; Scheduled Time: 1600  Azalea Lundy RN  
16:04 Medication Given traMADol (ULTRAM) tablet 50 mg -  Dose: 50 mg ; Route: Oral  Chito Childers RN

## 2018-11-08 NOTE — PROGRESS NOTES
11/08/18 1120 Fetal Vital Signs Mode External  
Fetal Heart Rate 130 Fetal Activity Present Variability 6-25 BPM  
Decelerations None Accelerations Yes RN Reviewed Strip? Yes Non Stress Test Reactive Uterine Activity Mode External  
Frequency (min) 0

## 2018-11-08 NOTE — CONSULTS
MFM Antepartum Consult Note  (Had trouble saving and editing note, could not make changes to problem list)    Patient admitted for \"not feeling well and finding of elevated BPs\". on . BP elevated the last week now with severe range BPs. Patient's pregnancy complicated by severe back pain and fibroid pain in pelvis. Patient has been taking Tramadol for pain, 1-2 per day throughout pregnancy. Patient denies HA, abdominal pain, vision changes. Review of all patients past BPs shows elevated in past.  Patient has received 2 doses of steroids and was started on Labetalol per my recommendation yesterday due to probable underlying chronic hypertension. She said she had to have the Tramadol or would leave the hospital.  Vitals 2017   Blood Pressure 188/103 172/89 120/73 122/72   Pulse 110 99     Temp  98.2     Resp 16 16     Height  5' 1\" 5' 1\" 5' 1\"   Weight  240 lb 236 lb 208 lb   SpO2 97 99     BSA  2.16 m2 2.15 m2 2.01 m2   BMI  45.35 kg/m2 44.59 kg/m2 39.3 kg/m2     Vitals 3/31/2017 2017   Blood Pressure 129/75 140/90   Pulse     Temp     Resp     Height 5' 1\" 5' 1\"   Weight 230 lb 236 lb   SpO2     BSA 2.12 m2 2.15 m2   BMI 43.46 kg/m2 44.59 kg/m2       Vitals:    Patient Vitals for the past 24 hrs:   BP   18 1717 143/85   18 1222 146/72   18 0829 132/84   18 0654 141/80   18 0115 138/82   18 0114 138/82     Temp (24hrs), Av.8 °F (37.1 °C), Min:98.5 °F (36.9 °C), Max:99.1 °F (37.3 °C)      Exam:  Patient without distress.                Abdomen: soft, non-tender               Fundus: soft and non tender               Fundal Height: 38 cm               Right Upper Quadrant: non-tender               Perineum: No sign of blood or amniotic fluid               Lower Extremity Edema: 2+               Patellar Reflexes: 1+ bilaterally               Clonus: absent    Cervical Exam: Uterine Activity: Frequency (min): 0                                   Membranes: Membrane Status: Intact                              Fetal Heart Rate: Mode: ExternalFetal Heart Rate: 120          Labs:   CBC:    Recent Labs     11/05/18 1856 07/03/18  1620 06/15/18  2046 04/24/18  1547 04/24/18   WBC 13.4*  --  13.6* 12.5*  --    HGB 10.4* 11.7 12.1 11.7  --    HCT 32.5*  --  38.1 36.5  --      --  315 356  --    HGBEXT  --   --   --   --  11.7   HCTEXT  --   --   --   --  36.5   PLTEXT  --   --   --   --  356       CMP:   Recent Labs     11/05/18  1856 06/15/18  2046    135*   K 4.0 3.8    101   CO2 23 23   AGAP 10 11   * 89   BUN 9 1*   CREA 0.74 0.61   GFRAA >60 >60   GFRNA >60 >60   CA 9.0 9.5   ALB  --  2.8*   TP  --  7.9   GLOB  --  5.1*   AGRAT  --  0.5*   SGOT  --  14*   ALT  --  15       Recent Labs     11/05/18  1930 11/05/18 1856   URICA  --  5.0   LDH  --  168     --        COAGS:  No results for input(s): APTT, PTP, INR in the last 7224 hours. Invalid input(s): PTTP, INRT, INREXT, INREXT    Recent Glucose Results: Recent Glucose Results:   Recent Labs     11/05/18  1856 06/15/18  2046   * 89       Prenatal Labs:    Lab Results   Component Value Date/Time    Rubella, External immune 04/24/2018    HBsAg, External negative 04/24/2018    HIV, External non reactive 04/24/2018    RPR, External no reactive 04/24/2018    Gonorrhea, External negative 05/23/2018    Chlamydia, External negative 05/23/2018         Assessment and Plan:    Patient Active Problem List    Diagnosis    Chronic hypertension with superimposed preeclampsia     11/7/2018 [facility] Inpatient Consult-New onset hypertensition but has had elevated BPs early in pregnancy. I think has component of underlying chronic hypertension so safe to treat severe BPs with Labetalol as all other labs normal and appropriate fetal growth yesterday, normal YINKA, BPP-8/8.   Meets criteria for preeclampsia by 24 hour urine, would diagnose her a superimposed. Explained to her expectant management until severe range BPs or Severe HA or worsening labs. Explained recommendations to patient and  at length. They understand and agree. I also recommended no further Tramadol as could mask symptoms and affect . · Expectant management until 37 weeks, then delivery by induction or primary C/S.  · Deliver for 2 severe range BPs an hour apart or Severe HA, fetal non-reassurance or HELLP syndrome. · Repeat labs in am.  · Get type and Cross, is at risk of PP Hemorrhage. 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 Per Dr Lynda Paredes will need fasting homocysteine level checked at next visit 18  · Counseled today no need to take folate rather than folic acid especially without fasting hyperhomocystenemia, but no harm if she desires. · Not associated with poor prenatal outcomes. 18 Fasting Homocysteine 4.2      Uterine fibroids affecting pregnancy, third trimester     2 fibroids seen at EOB ultrasound    2018 [facility]: fibroids noted; one large 7cm at uterocervix junction. 2018 at [facility]: AC 18%, YINKA WNL CL 3.7 cm. Large fibroid 7.1 x 8.6 x 7.8 and small fibroid 3.6 x 2.8 x 2.7 Patient still c/o cramping and some spotting bleeding due to fibroids  2018 at [facility]:  Stable fibroids. Appropriate fetal growth, unable to complete Echo. AC 37%, overall 48%, YINKA 19 cm. 10/3/2018 at [facility]:  CL normal at 4.9 cm    · Fibroid degeneration precautions. · No additional CL's needed.     See High Risk Pregnancy Overview    10/15/18 FFN- NEGATIVE      Back pain complicating pregnancy, third trimester     Sees Dr Tess Hua at [facility] in [place], trying to get her referred to pain management in [place]. H/o Lumbar diskectomy- L5-S1 ? anesthesia consult pt is scheduled for anesthesia consult 10/24 at 1pm    5/25/2018 at [facility]:  Pt being managed by [facility]; but per patient, Dr. Katherine James will prescribe medications while pregnant. Currently taking Flexeril and Tylenol #3. Next follow up with [facility] is 6/6.  9/5/2018 at [facility]:  Continues to take Tylenol #3, Flexeril, and Ultram.  10/3/2018 at [facility]:  Continues to take Tylenol #3, Flexeril, and Ultram prn. · Defer management to [facility]. · Recommend anesthesia PAT at ~32 weeks; patient counseled to bring MRI records with her to that appt---> Primary OB to schedule                                                                 Timing of delivery recommendations are based on ACOG Committee Opinion #560, April 2013 and \"Hypertension in Zero Zack" task force report by [employer]G, Nov 2013. Time: 110    Minutes spent on floor,with greater than 50% of the time examining patient, explaining plan and coordinating care with nurse and requesting primary physician.

## 2018-11-08 NOTE — PROGRESS NOTES
11/08/18 1357 Maternal Vital Signs Temp 98.5 °F (36.9 °C) Temp Source Oral  
Pulse (Heart Rate) (!) 106 Resp Rate 20 Level of Consciousness Alert /74 MAP (Calculated) 97 BP 1 Method Automatic  
BP 1 Location Right arm BP Patient Position Sitting

## 2018-11-09 PROCEDURE — 74011000250 HC RX REV CODE- 250: Performed by: OBSTETRICS & GYNECOLOGY

## 2018-11-09 PROCEDURE — 65270000029 HC RM PRIVATE

## 2018-11-09 PROCEDURE — 74011250637 HC RX REV CODE- 250/637: Performed by: OBSTETRICS & GYNECOLOGY

## 2018-11-09 PROCEDURE — 59025 FETAL NON-STRESS TEST: CPT

## 2018-11-09 RX ADMIN — TRAMADOL HYDROCHLORIDE 50 MG: 50 TABLET, FILM COATED ORAL at 13:00

## 2018-11-09 RX ADMIN — POLYETHYLENE GLYCOL (3350) 17 G: 17 POWDER, FOR SOLUTION ORAL at 16:16

## 2018-11-09 RX ADMIN — LEVOTHYROXINE SODIUM 75 MCG: 50 TABLET ORAL at 06:29

## 2018-11-09 RX ADMIN — LABETALOL HYDROCHLORIDE 100 MG: 100 TABLET, FILM COATED ORAL at 16:14

## 2018-11-09 RX ADMIN — LABETALOL HYDROCHLORIDE 100 MG: 100 TABLET, FILM COATED ORAL at 23:16

## 2018-11-09 RX ADMIN — LABETALOL HYDROCHLORIDE 100 MG: 100 TABLET, FILM COATED ORAL at 08:35

## 2018-11-09 NOTE — PROGRESS NOTES
Problem: Nutrition Deficit Goal: *Optimize nutritional status Nutrition Reason for assessment:  Length of stayAssessment:  
Diet order(s):  Regular Pt admitted with pre eclampsia Food/Nutrition Patient History:  Pt currently 36 weeks 1 day. No nutrition risk factors identified on nursing admission malnutrition screening tool. Pt verbalizes good po intake prior to admission and currently. Anthropometrics:  Height: 5'1\"  Weight: 127.9 kg (282 lb)(from prenatal records), BMI: 53.3 Weight History obtained from EMR: 
 
WT / BMI WEIGHT  
11/8/2018 282 lb  
11/5/2018 282 lb  
11/5/2018 11/1/2018 281 lb 3.2 oz  
10/23/2018 280 lb  
10/15/2018 280 lb 6.4 oz  
10/3/2018 280 lb 3.2 oz  
10/3/2018 281 lb  
9/26/2018 281 lb  
9/13/2018 279 lb  
9/5/2018 269 lb  
8/16/2018 269 lb  
7/31/2018 252 lb  
7/23/2018 262 lb  
7/19/2018 264 lb  
7/3/2018 261 lb  
6/20/2018 258 lb  
6/15/2018 205 lb  
5/25/2018 257 lb  
5/23/2018 259 lb  
4/24/2018 256 lb  
4/11/2018 255 lb  
4/7/2018 240 lb Macronutrient needs: EER:  ~ 2727 kcal /day (25 kcal/kg pre pregnant weight of ~ 109 kg (240#) EPR:  ~ 73 grams protein/day (1 grams/kg pre pregnant IBW + additional 25 gm/day r/t pregnancy) Intake/Comparative Standards:  No recorded intake. Insufficient data to determine % estimated kcal or protein needs met at this time. Pt has gained > 41# this pregnancy based on weight history obtained in EMR. Nutrition Diagnosis:  No nutrition diagnosis at this time. Intervention: 
Meals and snacks: Continue current diet. Discharge nutrition plan: Too soon to determine. Javier Alexandre, MPH, [address] [place] [address],  
[phone]

## 2018-11-09 NOTE — PROGRESS NOTES
Ante Partum High Risk Pregnancy Note Patient admitted for preeclampsia states she does not  have  headache , abdominal pain  , contractions and right upper quadrant pain  . States completely asymptomatic- 
Discussed plan of care Asked about waiting to 38 weeks- reviewed MFM plan for 37 Took one Ultram yesterday LOS:   
Vitals: Temp (24hrs), Av.7 °F (37.1 °C), Min:98.5 °F (36.9 °C), Max:99 °F (37.2 °C) Patient Vitals for the past 24 hrs: 
 BP  
18 0836 147/80  
18 2313 139/81  
18 2310 139/81  
18 2031 135/85  
18 1604 146/82  
18 1603 146/82  
18 1357 144/74 I&O:   No intake/output data recorded.  1901 -  0700 In: 1400 [P.O.:1400] Out: 1200 [Urine:1200] Exam:  Patient without distress. Abdomen: soft, non-tender Fundus: soft and non tender NST PENDING Lab/Data Review: 
CMP: No results found for: NA, K, CL, CO2, AGAP, GLU, BUN, CREA, GFRAA, GFRNA, CA, MG, PHOS, ALB, TBIL, TP, ALB, GLOB, AGRAT, SGOT, ALT, GPT 
CBC: No results found for: WBC, HGB, HGBEXT, HCT, HCTEXT, PLT, PLTEXT, HGBEXT, HCTEXT, PLTEXT Assessment and Plan:   
 
Principal Problem: 
  Chronic hypertension with superimposed preeclampsia (2018) Overview: 2018 [facility] Inpatient Consult-New onset hypertensition but has had  
    elevated BPs early in pregnancy. I think has component of underlying  
    chronic hypertension so safe to treat severe BPs with Labetalol as all  
    other labs normal and appropriate fetal growth yesterday, normal YINKA,  
    BPP-8/8. Meets criteria for preeclampsia by 24 hour urine, would diagnose  
    her a superimposed. Explained to her expectant management until severe  
    range BPs or Severe HA or worsening labs. Explained recommendations to  
    patient and  at length. They understand and agree. I also recommended no further Tramadol as could mask symptoms. · Expectant management until 37 weeks, then delivery by induction or  
    primary C/S. 
    · Deliver for 2 severe range BPs an hour apart or Severe HA, fetal  
    non-reassurance or HELLP syndrome. · Repeat labs in am. 
    · Get type and Cross, is at risk of PP Hemorrhage. · Chronic pain, refuses to stop taking Tramadol, will get Neonatologist  
    consultation to observe  for withdrawal. 
 
 
 
 
Preeclampsia:  moderate  Daily Fetal monitoring with Non-stress tests Deliver at 37 unless becomes severe

## 2018-11-09 NOTE — PROGRESS NOTES
Pt states was able to rest overnight, synthroid given as pt states this is her normal time to take it

## 2018-11-09 NOTE — PROGRESS NOTES
Assessment complete at this time per flow sheet. Vital signs stable. Patient denies chest pain, contractions, fever, headache, nausea and vomiting, pelvic pressure, right upper quadrant pain, shortness of breath, vaginal bleeding, vaginal leaking of fluid and visual disturbances. Patient states positive fetal movement. Encouraged to call as needed.

## 2018-11-10 PROCEDURE — 74011250637 HC RX REV CODE- 250/637: Performed by: OBSTETRICS & GYNECOLOGY

## 2018-11-10 PROCEDURE — 59025 FETAL NON-STRESS TEST: CPT

## 2018-11-10 PROCEDURE — 65270000029 HC RM PRIVATE

## 2018-11-10 RX ADMIN — LABETALOL HYDROCHLORIDE 100 MG: 100 TABLET, FILM COATED ORAL at 16:19

## 2018-11-10 RX ADMIN — LEVOTHYROXINE SODIUM 75 MCG: 50 TABLET ORAL at 07:21

## 2018-11-10 RX ADMIN — LABETALOL HYDROCHLORIDE 100 MG: 100 TABLET, FILM COATED ORAL at 22:57

## 2018-11-10 RX ADMIN — TRAMADOL HYDROCHLORIDE 50 MG: 50 TABLET, FILM COATED ORAL at 13:43

## 2018-11-10 RX ADMIN — LABETALOL HYDROCHLORIDE 100 MG: 100 TABLET, FILM COATED ORAL at 07:21

## 2018-11-10 NOTE — PROGRESS NOTES
Pt given 100mg labetalol po. See flowsheet and MAR. Pt declines hourly rounding tonight. States will call out if needs anything. Explained to pt that this RN would be coming in to check on her periodically very quietly and assured her to call if needs anything

## 2018-11-10 NOTE — PROGRESS NOTES
Pt resting in bed, NST in progress, assessment completed with no abnormalities noted, states she may want her tramadol later. Encouraged pt to call out when ready.

## 2018-11-10 NOTE — PROGRESS NOTES
Pt sitting up in chair states the pain is better in that position, instructed pt to call with needs.

## 2018-11-10 NOTE — PROGRESS NOTES
Ante Partum High Risk Pregnancy Note Patient admitted for preeclampsia states she does not  have  headache , abdominal pain  , contractions and right upper quadrant pain  . Has back pian- chronic, no change Spoke with RN too- no issues Spoke to pt thru bathroom door- indicates no problems LOS:   
Vitals: Temp (24hrs), Av.3 °F (36.8 °C), Min:98.2 °F (36.8 °C), Max:98.3 °F (36.8 °C) Patient Vitals for the past 24 hrs: 
 BP  
11/10/18 0957 147/88  
11/10/18 0646 139/78  
18 2318 133/76  
18 2316 133/76  
18 2054 145/79  
18 1817 145/74  
18 1816 145/74  
18 1244 137/79 I&O:   No intake/output data recorded.  1901 - 11/10 0700 In: 95 [address] [place] [P.O.:4840] Out: 4600 [HZCBK:7816] Exam:  Patient without distress. - voices no complaints NST:  reactive Lab/Data Review: All lab results for the last 24 hours reviewed. Assessment and Plan:   
 
Principal Problem: 
  Chronic hypertension with superimposed preeclampsia (2018) Overview: 2018 [facility] Inpatient Consult-New onset hypertensition but has had  
    elevated BPs early in pregnancy. I think has component of underlying  
    chronic hypertension so safe to treat severe BPs with Labetalol as all  
    other labs normal and appropriate fetal growth yesterday, normal YINKA,  
    BPP-8/8. Meets criteria for preeclampsia by 24 hour urine, would diagnose  
    her a superimposed. Explained to her expectant management until severe  
    range BPs or Severe HA or worsening labs. Explained recommendations to  
    patient and  at length. They understand and agree. I also  
    recommended no further Tramadol as could mask symptoms. · Expectant management until 37 weeks, then delivery by induction or  
    primary C/S. 
    · Deliver for 2 severe range BPs an hour apart or Severe HA, fetal  
    non-reassurance or HELLP syndrome. · Repeat labs in am. 
    · Get type and Cross, is at risk of PP Hemorrhage. · Chronic pain, refuses to stop taking Tramadol, will get Neonatologist  
    consultation to observe  for withdrawal. 
 
 
 
 
Preeclampsia:  moderate  Daily Fetal monitoring with Non-stress tests Deliver at 37 unless becomes severe

## 2018-11-10 NOTE — PROGRESS NOTES
Pt called out and states that her back is hurting and would like some tramadol but will wait until breakfast. Encouraged to call if she changes her mind. B/p taken. Reflexes brisk bilaterally. No clonus. No headache.

## 2018-11-10 NOTE — PROGRESS NOTES
Assessment completed. Pt denies any blurred vision or flashes of light. Vitals stable. Was on external monitor and reactive strip obtained. Reflex in right leg +3 and left leg +2. No clonus. Pt to call if needs anything.

## 2018-11-11 LAB
ABO + RH BLD: NORMAL
BLD PROD TYP BPU: NORMAL
BLD PROD TYP BPU: NORMAL
BLOOD GROUP ANTIBODIES SERPL: NORMAL
BPU ID: NORMAL
BPU ID: NORMAL
CROSSMATCH RESULT,%XM: NORMAL
CROSSMATCH RESULT,%XM: NORMAL
SPECIMEN EXP DATE BLD: NORMAL
STATUS OF UNIT,%ST: NORMAL
STATUS OF UNIT,%ST: NORMAL
UNIT DIVISION, %UDIV: 0
UNIT DIVISION, %UDIV: 0

## 2018-11-11 PROCEDURE — 74011250637 HC RX REV CODE- 250/637: Performed by: OBSTETRICS & GYNECOLOGY

## 2018-11-11 PROCEDURE — 86901 BLOOD TYPING SEROLOGIC RH(D): CPT

## 2018-11-11 PROCEDURE — 65270000029 HC RM PRIVATE

## 2018-11-11 PROCEDURE — 86923 COMPATIBILITY TEST ELECTRIC: CPT

## 2018-11-11 PROCEDURE — 59025 FETAL NON-STRESS TEST: CPT

## 2018-11-11 PROCEDURE — 36415 COLL VENOUS BLD VENIPUNCTURE: CPT

## 2018-11-11 RX ORDER — SODIUM CHLORIDE 9 MG/ML
250 INJECTION, SOLUTION INTRAVENOUS AS NEEDED
Status: DISCONTINUED | OUTPATIENT
Start: 2018-11-11 | End: 2018-11-16

## 2018-11-11 RX ADMIN — LABETALOL HYDROCHLORIDE 100 MG: 100 TABLET, FILM COATED ORAL at 16:01

## 2018-11-11 RX ADMIN — TRAMADOL HYDROCHLORIDE 50 MG: 50 TABLET, FILM COATED ORAL at 12:34

## 2018-11-11 RX ADMIN — LEVOTHYROXINE SODIUM 75 MCG: 50 TABLET ORAL at 08:19

## 2018-11-11 RX ADMIN — LABETALOL HYDROCHLORIDE 100 MG: 100 TABLET, FILM COATED ORAL at 23:50

## 2018-11-11 RX ADMIN — LABETALOL HYDROCHLORIDE 100 MG: 100 TABLET, FILM COATED ORAL at 08:19

## 2018-11-11 NOTE — PROGRESS NOTES
Pt sitting up in bed, labetalol and synthroid given. Pt instructed to call after breakfast for assessment and NST.

## 2018-11-11 NOTE — PROGRESS NOTES
Ante Partum High Risk Pregnancy Note Patient admitted for preeclampsia states she does not  have  headache , abdominal pain  , contractions and right upper quadrant pain  . Had isolated severe range pressures however did not have 2  by one hour and BP came down with scheduled meds Per [facility] deliver for 2 severe range pressures  by one hour LOS:   
Vitals: Temp (24hrs), Av.3 °F (36.8 °C), Min:98.1 °F (36.7 °C), Max:98.5 °F (36.9 °C) Patient Vitals for the past 24 hrs: 
 BP  
18 1021 137/79  
18 0819 166/80  
11/10/18 2319 120/59  
11/10/18 2257 (!) 196/93  
11/10/18 1822 141/79  
11/10/18 1619 (!) 168/91  
11/10/18 1455 136/75 I&O:   No intake/output data recorded.  1901 -  0700 In: 5781 [P.O.:5781] Out: Juliet Berrios [OACAO:0644] Exam:  Patient without distress. Abdomen: soft, non-tender Fundus: soft and non tender Fetal Monitoring:  Baseline: 150 bpm, Variability: Good {> 6 bpm) and Accelerations: Reactive Uterine Activity: None NST:  reactive Lab/Data Review: All lab results for the last 24 hours reviewed. Assessment and Plan:   
 
Principal Problem: 
  Chronic hypertension with superimposed preeclampsia (2018) Overview: 2018 [facility] Inpatient Consult-New onset hypertensition but has had  
    elevated BPs early in pregnancy. I think has component of underlying  
    chronic hypertension so safe to treat severe BPs with Labetalol as all  
    other labs normal and appropriate fetal growth yesterday, normal YINKA,  
    BPP-8/8. Meets criteria for preeclampsia by 24 hour urine, would diagnose  
    her a superimposed. Explained to her expectant management until severe  
    range BPs or Severe HA or worsening labs. Explained recommendations to  
    patient and  at length. They understand and agree. I also recommended no further Tramadol as could mask symptoms. · Expectant management until 37 weeks, then delivery by induction or  
    primary C/S. 
    · Deliver for 2 severe range BPs an hour apart or Severe HA, fetal  
    non-reassurance or HELLP syndrome. · Repeat labs in am. 
    · Get type and Cross, is at risk of PP Hemorrhage. · Chronic pain, refuses to stop taking Tramadol, will get Neonatologist  
    consultation to observe  for withdrawal. 
 
 
 
 
Preeclampsia:  moderate  deliver at 37 unless becomes severe

## 2018-11-11 NOTE — PROGRESS NOTES
Pt with a very elevated BP, pt had been watching a sports game and moving in bed. Recheck of BP s/p PO labetalol and pt turning off the TV/resting was WNL.

## 2018-11-12 PROCEDURE — 74011250637 HC RX REV CODE- 250/637: Performed by: OBSTETRICS & GYNECOLOGY

## 2018-11-12 PROCEDURE — 65270000029 HC RM PRIVATE

## 2018-11-12 RX ADMIN — LEVOTHYROXINE SODIUM 75 MCG: 50 TABLET ORAL at 07:26

## 2018-11-12 RX ADMIN — LABETALOL HYDROCHLORIDE 100 MG: 100 TABLET, FILM COATED ORAL at 16:20

## 2018-11-12 RX ADMIN — LABETALOL HYDROCHLORIDE 100 MG: 100 TABLET, FILM COATED ORAL at 08:26

## 2018-11-12 RX ADMIN — TRAMADOL HYDROCHLORIDE 50 MG: 50 TABLET, FILM COATED ORAL at 22:17

## 2018-11-12 RX ADMIN — LABETALOL HYDROCHLORIDE 100 MG: 100 TABLET, FILM COATED ORAL at 23:08

## 2018-11-12 NOTE — PROGRESS NOTES
Ante Partum High Risk Pregnancy Note Patient: Jairon Bernard MRN: [id number] Patient admitted for chronic hypertension states she does not  have  headache , abdominal pain  , contractions, right upper quadrant pain  , vaginal bleeding , swelling and vaginal leaking of fluid . Vitals: Temp (24hrs), Av.8 °F (37.1 °C), Min:98.8 °F (37.1 °C), Max:98.8 °F (37.1 °C) Patient Vitals for the past 24 hrs: 
 BP  
18 0729 143/73  
18 2319 134/69  
18 2143 (!) 150/93  
18 1742 145/89  
18 1437 125/83 I&O:   No intake/output data recorded. No intake/output data recorded. Exam:  Patient without distress. Abdomen: soft, non-tender Fundus: soft and non tender Fundal Height: 36 cm Right Upper Quadrant: non-tender Perineum: No sign of blood or amniotic fluid Lower Extremities: No 
             Patellar Reflexes: 1+ bilaterally Clonus: absent Fetal Monitoring:  No decels Uterine Activity: None NST:  reactive Labs:  
Recent Results (from the past 24 hour(s)) TYPE + CROSSMATCH Collection Time: 18  7:26 PM  
Result Value Ref Range Crossmatch Expiration 2018 ABO/Rh(D) A POSITIVE Antibody screen NEG Unit number [id number] Blood component type [facility] Unit division 00 Status of unit ALLOCATED Crossmatch result Compatible Unit number [id number] Blood component type [facility] Unit division 00 Status of unit ALLOCATED Crossmatch result Compatible Assessment and Plan:   
 
Principal Problem: 
  Chronic hypertension with superimposed preeclampsia (2018) Overview: 2018 [facility] Inpatient Consult-New onset hypertensition but has had  
    elevated BPs early in pregnancy. I think has component of underlying chronic hypertension so safe to treat severe BPs with Labetalol as all  
    other labs normal and appropriate fetal growth yesterday, normal YINKA,  
    BPP-/8. Meets criteria for preeclampsia by 24 hour urine, would diagnose  
    her a superimposed. Explained to her expectant management until severe  
    range BPs or Severe HA or worsening labs. Explained recommendations to  
    patient and  at length. They understand and agree. I also  
    recommended no further Tramadol as could mask symptoms. · Expectant management until 37 weeks, then delivery by induction or  
    primary C/S. 
    · Deliver for 2 severe range BPs an hour apart or Severe HA, fetal  
    non-reassurance or HELLP syndrome. · Repeat labs in am. 
    · Get type and Cross, is at risk of PP Hemorrhage. · Chronic pain, refuses to stop taking Tramadol, will get Neonatologist  
    consultation to observe  for withdrawal. 
 
 
 
 
Pregnancy-Induced Hypertension:  Continue present management PIH precautions Antepartum testing Consulted Dr. Brooke Worley Plan is to induce on Thursday, with cytotec wed pm. See MFM recommendations per delivery if condition worsens

## 2018-11-12 NOTE — ADT AUTH CERT NOTES
Hypertensive Disorders of Pregnancy - Care Day 8 (11/12/2018) by Talha Garcia RN Review Status Review Entered Completed 11/12/2018 15:01 Criteria Review Care Day: 8 Care Date: 11/12/2018 Level of Care: Inpatient Floor Guideline Day 2 Clinical Status ( ) * Blood pressure normal or adequately controlled  
(X) * No seizure activity identified 11/12/2018 3:01 PM EST by Mansi Saldana  
  none noted ( ) * Laboratory values normal or improved  
(X) * Fetal status acceptable  
(X) * Delivery not indicated imminently ( ) * Discharge plans and education understood Activity ( ) * Ambulatory Routes  
(X) * Oral hydration, medications, and diet 11/12/2018 3:01 PM EST by Mansi Saldana  
  reguarl diet; labetalol 100mg PO Q8h, synthroid 75mcg PO daily Interventions  
(X) Fetal monitoring, including daily fetal movements 11/12/2018 3:01 PM EST by Mansi Saldana  
  fetal monitoring BID, fetal Non stress test BID Medications  
(X) Oral antihypertensives 11/12/2018 3:01 PM EST by Mansi Saldana  
  labetalol 100mg PO Q8h  
  
  
11/12/2018 3:01 PM EST by Mansi Saldana Subject: Additional Clinical Information 11/12/18Vitals: HR 93, /73  meds: labetalol 100mg PO Q8h, synthroid 75mcg PO daily  
labs: No new results for 11/12/18 Plan: regular diet, bed rest with bathroom privileges, fetal monitoring BID, fetal Non stress test BID, I&O * Milestone Additional Notes 11/12/18 OB-GYN:  
Pregnancy-Induced Hypertension:  Continue present management PIH precautions Antepartum testing Consulted Dr. Phyllis Thorpe Plan is to induce on Thursday, with cytotec wed pm. See MFM recommendations per delivery if condition worsens Hypertensive Disorders of Pregnancy - Care Day 7 (11/11/2018) by Talha Garcia RN Review Status Review Entered Completed 11/12/2018 14:59 Criteria Review Care Day: 7 Care Date: 11/11/2018 Level of Care: Inpatient Floor Guideline Day 2 Clinical Status ( ) * Blood pressure normal or adequately controlled 11/12/2018 2:59 PM EST by Ml Carroll  
  Had isolated severe range pressures however did not have 2  by one hour  
  
(X) * No seizure activity identified 11/12/2018 2:59 PM EST by Ml Carroll  
  none noted ( ) * Laboratory values normal or improved  
(X) * Fetal status acceptable  
(X) * Delivery not indicated imminently ( ) * Discharge plans and education understood Activity ( ) * Ambulatory Routes  
(X) * Oral hydration, medications, and diet 11/12/2018 2:59 PM EST by Ml Carroll  
  regular diet; labetalol 100mg PO Q8h, synthroid 75mcg PO daily, ultram 50mg PO X1 Interventions  
(X) Fetal monitoring, including daily fetal movements 11/12/2018 2:59 PM EST by Ml Carroll  
  fetal monitoring BID, fetal Non stress test BID Medications  
(X) Oral antihypertensives 11/12/2018 2:59 PM EST by Ml Carroll  
  labetalol 100mg PO Q8h  
  
  
11/12/2018 2:59 PM EST by Ml Carroll Subject: Additional Clinical Information 11/11/18Vitals: 98.8, 110, 18, 150/93 meds: labetalol 100mg PO Q8h, synthroid 75mcg PO daily, ultram 50mg PO X1 Labs: No new results for 11/11/18 Plan: regular diet, bed rest with bathroom privileges, fetal monitoring BID, fetal Non stress test BID, I&O * Milestone Additional Notes 11/11/18 OBGYN:  
Had isolated severe range pressures however did not have 2  by one hour and BP came down with scheduled meds Per M deliver for 2 severe range pressures  by one hour · Expectant management until 37 weeks, then delivery by induction or   
    primary C/S.   
    · Deliver for 2 severe range BPs an hour apart or Severe HA, fetal   
    non-reassurance or HELLP syndrome.  
    · Repeat labs in am.  
     · Get type and Cross, is at risk of PP Hemorrhage.  
    · Chronic pain, refuses to stop taking Tramadol, will get Neonatologist   
    consultation to observe  for withdrawal.  
   
   
Preeclampsia: Sharon Erp at 37 unless becomes severe  
   
  
  
  
Hypertensive Disorders of Pregnancy - Care Day 6 (11/10/2018) by Sunshine Andre RN Review Status Review Entered Completed 2018 14:56 Criteria Review Care Day: 6 Care Date: 11/10/2018 Level of Care: Inpatient Floor Guideline Day 2 Clinical Status ( ) * Blood pressure normal or adequately controlled  
(X) * No seizure activity identified 2018 2:56 PM EST by Marielena Chambers  
  none noted ( ) * Laboratory values normal or improved  
(X) * Fetal status acceptable  
(X) * Delivery not indicated imminently ( ) * Discharge plans and education understood Activity ( ) * Ambulatory Routes  
(X) * Oral hydration, medications, and diet 2018 2:56 PM EST by Marielena Chambers  
  regular diet; labetalol 100mg PO Q8h, synthroid 75mcg PO daily, ultram 50mg PO X1 Interventions  
(X) Fetal monitoring, including daily fetal movements 2018 2:56 PM EST by Marielena Chambers  
  fetal monitoring BID, fetal Non stress test BID Medications  
(X) Oral antihypertensives 2018 2:56 PM EST by Marielena Chambers  
  labetalol 100mg PO Q8h  
  
  
2018 2:56 PM EST by Marielena Chambers Subject: Additional Clinical Information 11/10/18Vitals: 98.5, 113, 196/93, 18 meds: labetalol 100mg PO Q8h, synthroid 75mcg PO daily, ultram 50mg PO X1 labs: No new results for 11/10/18 plan: regular diet, bed rest with bathroom privileges, fetal monitoring BID, fetal Non stress test BID, I&O * Milestone Additional Notes 11/10/18 OB-GYN:  
Patient admitted for preeclampsia states she does not Abbe Huber , abdominal pain  , contractions and right upper quadrant pain  . Has back pian- chronic, no change Spoke with RN too- no issues Spoke to pt thru bathroom door- indicates no problems Expectant management until 37 weeks, then delivery by induction or primary C/S.     · Deliver for 2 severe range BPs an hour apart or Severe HA, fetal non-reassurance or HELLP syndrome.  
    · Repeat labs in am.  
    · Get type and Cross, is at risk of PP Hemorrhage.  
    · Chronic pain, refuses to stop taking Tramadol, will get Neonatologist consultation to observe  for withdrawal.  
  
Preeclampsia:  moderate  Daily Fetal monitoring with Non-stress tests Deliver at 37 unless becomes severe

## 2018-11-13 PROCEDURE — 74011250637 HC RX REV CODE- 250/637: Performed by: OBSTETRICS & GYNECOLOGY

## 2018-11-13 PROCEDURE — 59025 FETAL NON-STRESS TEST: CPT

## 2018-11-13 PROCEDURE — 65270000029 HC RM PRIVATE

## 2018-11-13 RX ADMIN — LABETALOL HYDROCHLORIDE 100 MG: 100 TABLET, FILM COATED ORAL at 07:35

## 2018-11-13 RX ADMIN — TRAMADOL HYDROCHLORIDE 50 MG: 50 TABLET, FILM COATED ORAL at 23:22

## 2018-11-13 RX ADMIN — LABETALOL HYDROCHLORIDE 100 MG: 100 TABLET, FILM COATED ORAL at 16:30

## 2018-11-13 RX ADMIN — LABETALOL HYDROCHLORIDE 100 MG: 100 TABLET, FILM COATED ORAL at 23:22

## 2018-11-13 RX ADMIN — LEVOTHYROXINE SODIUM 75 MCG: 50 TABLET ORAL at 07:35

## 2018-11-13 NOTE — PROGRESS NOTES
16:30 Medication Given labetalol (NORMODYNE) tablet 100 mg -  Dose: 100 mg ; Route: Oral ; Scheduled Time: 1600  Christal Araiza RN

## 2018-11-13 NOTE — PROGRESS NOTES
11/13/18 1303 Maternal Vital Signs Pulse (Heart Rate) (!) 102 /82 MAP (Calculated) 108 BP 1 Method Automatic  
BP 1 Location Right arm BP Patient Position Sitting

## 2018-11-13 NOTE — PROGRESS NOTES
Pt sitting up in bed, Labetalol and Synthroid given, morning assessment completed. Pt states back pain is sharp and burning with a pain scale of 4/10, denies pain meds at this time. encouraged to call with needs.

## 2018-11-13 NOTE — PROGRESS NOTES
Ante Partum High Risk Pregnancy Note Patient admitted for chronic hypertension and preeclampsia states she does have normal fetal movement and does not  have  headache , abdominal pain  , contractions, right upper quadrant pain  , vaginal bleeding , swelling and vaginal leaking of fluid . LOS:  7 Vitals: Temp (24hrs), Av.3 °F (36.8 °C), Min:98.3 °F (36.8 °C), Max:98.3 °F (36.8 °C) Patient Vitals for the past 24 hrs: 
 BP  
18 0933 111/53  
18 0735 (!) 171/95  
18 2310 151/83  
18 2210 146/86  
18 2208 (!) 187/96  
18 1708 133/71  
18 1203 136/66 I&O:   No intake/output data recorded. No intake/output data recorded. Exam:  Patient without distress. Abdomen: soft, non-tender Fundus: soft and non tender Fundal Height: 38 cm Right Upper Quadrant: non-tender Perineum: No sign of blood or amniotic fluid Lower Extremities: 1+ Patellar Reflexes: 1+ bilaterally Clonus: absent Fetal Monitoring:  reassuring Uterine Activity: None NST:  reactive Lab/Data Review: All lab results for the last 24 hours reviewed. Assessment and Plan:   
 
Principal Problem: 
  Chronic hypertension with superimposed preeclampsia (2018) Overview: 2018 [facility] Inpatient Consult-New onset hypertensition but has had  
    elevated BPs early in pregnancy. I think has component of underlying  
    chronic hypertension so safe to treat severe BPs with Labetalol as all  
    other labs normal and appropriate fetal growth yesterday, normal YINKA,  
    BPP-8/8. Meets criteria for preeclampsia by 24 hour urine, would diagnose  
    her a superimposed. Explained to her expectant management until severe  
    range BPs or Severe HA or worsening labs.   Explained recommendations to  
 patient and  at length. They understand and agree. I also  
    recommended no further Tramadol as could mask symptoms. · Expectant management until 37 weeks, then delivery by induction or  
    primary C/S. 
    · Deliver for 2 severe range BPs an hour apart or Severe HA, fetal  
    non-reassurance or HELLP syndrome. · Repeat labs in am. 
    · Get type and Cross, is at risk of PP Hemorrhage. · Chronic pain, refuses to stop taking Tramadol, will get Neonatologist  
    consultation to observe  for withdrawal. 
 
 
 
 
Preeclampsia:  superimposed  cytotec tomorrow night with induction Thursday AM.

## 2018-11-13 NOTE — PROGRESS NOTES
11/13/18 1042 Fetal Vital Signs Mode External  
Fetal Heart Rate 130 Fetal Activity Present Variability 6-25 BPM  
Decelerations None Accelerations Yes RN Reviewed Strip? Yes Non Stress Test Reactive Uterine Activity Mode External  
Frequency (min) 0

## 2018-11-14 PROBLEM — O16.3 HYPERTENSION AFFECTING PREGNANCY IN THIRD TRIMESTER: Status: ACTIVE | Noted: 2018-11-14

## 2018-11-14 LAB
ABO + RH BLD: NORMAL
ALBUMIN SERPL-MCNC: 2.2 G/DL (ref 3.5–5)
ALBUMIN/GLOB SERPL: 0.4 {RATIO}
ALP SERPL-CCNC: 116 U/L (ref 50–130)
ALT SERPL-CCNC: 20 U/L (ref 12–65)
ANION GAP SERPL CALC-SCNC: 14 MMOL/L
AST SERPL-CCNC: 24 U/L (ref 15–37)
BACTERIA SPEC CULT: NORMAL
BILIRUB SERPL-MCNC: 0.3 MG/DL (ref 0.2–1.1)
BLD PROD TYP BPU: NORMAL
BLD PROD TYP BPU: NORMAL
BLOOD GROUP ANTIBODIES SERPL: NORMAL
BPU ID: NORMAL
BPU ID: NORMAL
BUN SERPL-MCNC: 8 MG/DL (ref 6–23)
CALCIUM SERPL-MCNC: 9.1 MG/DL (ref 8.3–10.4)
CHLORIDE SERPL-SCNC: 101 MMOL/L (ref 98–107)
CO2 SERPL-SCNC: 22 MMOL/L (ref 21–32)
CREAT SERPL-MCNC: 0.74 MG/DL (ref 0.6–1)
CROSSMATCH RESULT,%XM: NORMAL
CROSSMATCH RESULT,%XM: NORMAL
ERYTHROCYTE [DISTWIDTH] IN BLOOD BY AUTOMATED COUNT: 15.9 %
GLOBULIN SER CALC-MCNC: 5.4 G/DL (ref 2.3–3.5)
GLUCOSE SERPL-MCNC: 193 MG/DL (ref 65–100)
GRBS, EXTERNAL: NEGATIVE
HCT VFR BLD AUTO: 34.3 % (ref 35.8–46.3)
HGB BLD-MCNC: 10.6 G/DL (ref 11.7–15.4)
MCH RBC QN AUTO: 25.9 PG (ref 26.1–32.9)
MCHC RBC AUTO-ENTMCNC: 30.9 G/DL (ref 31.4–35)
MCV RBC AUTO: 83.9 FL (ref 79.6–97.8)
NRBC # BLD: 0 K/UL (ref 0–0.2)
PLATELET # BLD AUTO: 288 K/UL (ref 150–450)
PMV BLD AUTO: 9.9 FL (ref 9.4–12.3)
POTASSIUM SERPL-SCNC: 4.3 MMOL/L (ref 3.5–5.1)
PROT SERPL-MCNC: 7.6 G/DL
RBC # BLD AUTO: 4.09 M/UL (ref 4.05–5.2)
SERVICE CMNT-IMP: NORMAL
SODIUM SERPL-SCNC: 137 MMOL/L (ref 136–145)
SPECIMEN EXP DATE BLD: NORMAL
STATUS OF UNIT,%ST: NORMAL
STATUS OF UNIT,%ST: NORMAL
UNIT DIVISION, %UDIV: 0
UNIT DIVISION, %UDIV: 0
WBC # BLD AUTO: 13.3 K/UL (ref 4.3–11.1)

## 2018-11-14 PROCEDURE — 74011250637 HC RX REV CODE- 250/637: Performed by: OBSTETRICS & GYNECOLOGY

## 2018-11-14 PROCEDURE — 87653 STREP B DNA AMP PROBE: CPT

## 2018-11-14 PROCEDURE — 65270000029 HC RM PRIVATE

## 2018-11-14 PROCEDURE — 86923 COMPATIBILITY TEST ELECTRIC: CPT

## 2018-11-14 PROCEDURE — 36415 COLL VENOUS BLD VENIPUNCTURE: CPT

## 2018-11-14 PROCEDURE — 85027 COMPLETE CBC AUTOMATED: CPT

## 2018-11-14 PROCEDURE — 80053 COMPREHEN METABOLIC PANEL: CPT

## 2018-11-14 PROCEDURE — 86901 BLOOD TYPING SEROLOGIC RH(D): CPT

## 2018-11-14 PROCEDURE — 87081 CULTURE SCREEN ONLY: CPT

## 2018-11-14 RX ORDER — SODIUM CHLORIDE 0.9 % (FLUSH) 0.9 %
5-10 SYRINGE (ML) INJECTION EVERY 8 HOURS
Status: DISCONTINUED | OUTPATIENT
Start: 2018-11-14 | End: 2018-11-18

## 2018-11-14 RX ORDER — LIDOCAINE HYDROCHLORIDE 10 MG/ML
1 INJECTION INFILTRATION; PERINEURAL
Status: ACTIVE | OUTPATIENT
Start: 2018-11-14 | End: 2018-11-15

## 2018-11-14 RX ORDER — ZOLPIDEM TARTRATE 5 MG/1
5 TABLET ORAL
Status: DISCONTINUED | OUTPATIENT
Start: 2018-11-14 | End: 2018-11-18

## 2018-11-14 RX ORDER — DEXTROSE, SODIUM CHLORIDE, SODIUM LACTATE, POTASSIUM CHLORIDE, AND CALCIUM CHLORIDE 5; .6; .31; .03; .02 G/100ML; G/100ML; G/100ML; G/100ML; G/100ML
125 INJECTION, SOLUTION INTRAVENOUS CONTINUOUS
Status: DISCONTINUED | OUTPATIENT
Start: 2018-11-14 | End: 2018-11-17

## 2018-11-14 RX ORDER — LIDOCAINE HYDROCHLORIDE 20 MG/ML
JELLY TOPICAL
Status: ACTIVE | OUTPATIENT
Start: 2018-11-14 | End: 2018-11-15

## 2018-11-14 RX ORDER — FAMOTIDINE 20 MG/1
20 TABLET, FILM COATED ORAL
Status: DISCONTINUED | OUTPATIENT
Start: 2018-11-14 | End: 2018-11-18

## 2018-11-14 RX ORDER — ACETAMINOPHEN 500 MG
1000 TABLET ORAL
Status: DISCONTINUED | OUTPATIENT
Start: 2018-11-14 | End: 2018-11-17

## 2018-11-14 RX ORDER — ONDANSETRON 4 MG/1
4 TABLET, ORALLY DISINTEGRATING ORAL
Status: DISCONTINUED | OUTPATIENT
Start: 2018-11-14 | End: 2018-11-18

## 2018-11-14 RX ORDER — SODIUM CHLORIDE 0.9 % (FLUSH) 0.9 %
5-10 SYRINGE (ML) INJECTION AS NEEDED
Status: DISCONTINUED | OUTPATIENT
Start: 2018-11-14 | End: 2018-11-18

## 2018-11-14 RX ORDER — MINERAL OIL
120 OIL (ML) ORAL
Status: ACTIVE | OUTPATIENT
Start: 2018-11-14 | End: 2018-11-15

## 2018-11-14 RX ADMIN — LABETALOL HYDROCHLORIDE 100 MG: 100 TABLET, FILM COATED ORAL at 23:45

## 2018-11-14 RX ADMIN — MISOPROSTOL 50 MCG: 100 TABLET ORAL at 22:42

## 2018-11-14 RX ADMIN — MISOPROSTOL 50 MCG: 100 TABLET ORAL at 18:46

## 2018-11-14 RX ADMIN — LABETALOL HYDROCHLORIDE 100 MG: 100 TABLET, FILM COATED ORAL at 07:34

## 2018-11-14 RX ADMIN — LABETALOL HYDROCHLORIDE 100 MG: 100 TABLET, FILM COATED ORAL at 15:45

## 2018-11-14 RX ADMIN — LEVOTHYROXINE SODIUM 75 MCG: 50 TABLET ORAL at 07:34

## 2018-11-14 RX ADMIN — TRAMADOL HYDROCHLORIDE 50 MG: 50 TABLET, FILM COATED ORAL at 19:30

## 2018-11-14 NOTE — PROGRESS NOTES
07:34 Medication Given labetalol (NORMODYNE) tablet 100 mg -  Dose: 100 mg ; Route: Oral ; Scheduled Time: 0800  Blake Kwan RN  
07:34 Medication Given levothyroxine (SYNTHROID) tablet 75 mcg -  Dose: 75 mcg ; Route: Oral ; Scheduled Time: 0730  Blake Kwan RN

## 2018-11-14 NOTE — PROGRESS NOTES
15:45 Medication Given labetalol (NORMODYNE) tablet 100 mg -  Dose: 100 mg ; Route: Oral ; Scheduled Time: 1600  Tara Lange RN

## 2018-11-14 NOTE — PROGRESS NOTES
Lab called to verify blood bank will come to redraw type and cross at 2200 tonight. Original expires at [address].

## 2018-11-14 NOTE — PROGRESS NOTES
High Risk Obstetrics Progress Note Name: Perla Simpson MRN: [id number]  SSN: [id number] YOB: 1985  Age: 34 y.o. Sex: female Subjective: LOS: 9 days Estimated Date of Delivery: 18 Gestational Age Today: 36w7d Patient admitted for chronic hypertension and preeclampsia. States she does have normal fetal movement and does not have abdominal pain  , chest pain, contractions, fever, headache , nausea and vomiting, pelvic pressure, right upper quadrant pain  , shortness of breath, swelling, vaginal bleeding , vaginal leaking of fluid  and visual disturbances. Objective:  
 
Vitals:  Blood pressure 111/58, pulse 100, temperature 98.5 °F (36.9 °C), resp. rate 16, weight 127.9 kg (282 lb), last menstrual period 2018, SpO2 96 %, unknown if currently breastfeeding. Temp (24hrs), Av.4 °F (36.9 °C), Min:98.3 °F (36.8 °C), Max:98.5 °F (36.9 °C) Systolic (07ISD), HLN:868 , Min:111 , Max:171 Diastolic (29TLW), GOL:23, Min:53, Max:95 Intake and Output:    
  
 
Physical Exam: 
Patient without distress. Heart: Regular rate and rhythm Lung: clear to auscultation throughout lung fields, no wheezes, no rales, no rhonchi and normal respiratory effort Back: costovertebral angle tenderness absent Abdomen: soft, nontender Fundus: soft and non tender Membranes:  Intact Uterine Activity:  None Fetal Heart Rate:  reassuring Labs: No results found for this or any previous visit (from the past 36 hour(s)). Assessment and Plan:  
  
Principal Problem: 
  Chronic hypertension with superimposed preeclampsia (2018) Overview: 2018 [facility] Inpatient Consult-New onset hypertensition but has had  
    elevated BPs early in pregnancy.   I think has component of underlying  
    chronic hypertension so safe to treat severe BPs with Labetalol as all  
    other labs normal and appropriate fetal growth yesterday, normal YINKA,  
 BPP-8/8. Meets criteria for preeclampsia by 24 hour urine, would diagnose  
    her a superimposed. Explained to her expectant management until severe  
    range BPs or Severe HA or worsening labs. Explained recommendations to  
    patient and  at length. They understand and agree. I also  
    recommended no further Tramadol as could mask symptoms. · Expectant management until 37 weeks, then delivery by induction or  
    primary C/S. 
    · Deliver for 2 severe range BPs an hour apart or Severe HA, fetal  
    non-reassurance or HELLP syndrome. · Repeat labs in am. 
    · Get type and Cross, is at risk of PP Hemorrhage. · Chronic pain, refuses to stop taking Tramadol, will get Neonatologist  
    consultation to observe  for withdrawal. 
 
  
Preeclampsia:  superimposed. For cervical ripening tonight and induction in AM. Signed By: Savanah Vincent MD   
 2018

## 2018-11-15 ENCOUNTER — ANESTHESIA (OUTPATIENT)
Dept: LABOR AND DELIVERY | Age: 33
End: 2018-11-15
Payer: COMMERCIAL

## 2018-11-15 ENCOUNTER — ANESTHESIA EVENT (OUTPATIENT)
Dept: LABOR AND DELIVERY | Age: 33
End: 2018-11-15
Payer: COMMERCIAL

## 2018-11-15 PROCEDURE — 74011250637 HC RX REV CODE- 250/637: Performed by: OBSTETRICS & GYNECOLOGY

## 2018-11-15 PROCEDURE — 65270000029 HC RM PRIVATE

## 2018-11-15 PROCEDURE — 74011250636 HC RX REV CODE- 250/636: Performed by: OBSTETRICS & GYNECOLOGY

## 2018-11-15 PROCEDURE — 74011250636 HC RX REV CODE- 250/636

## 2018-11-15 RX ORDER — FENTANYL CITRATE 50 UG/ML
100 INJECTION, SOLUTION INTRAMUSCULAR; INTRAVENOUS ONCE
Status: ACTIVE | OUTPATIENT
Start: 2018-11-15 | End: 2018-11-16

## 2018-11-15 RX ORDER — OXYTOCIN/RINGER'S LACTATE 30/500 ML
0-25 PLASTIC BAG, INJECTION (ML) INTRAVENOUS
Status: DISCONTINUED | OUTPATIENT
Start: 2018-11-15 | End: 2018-11-17

## 2018-11-15 RX ORDER — FENTANYL CITRATE 50 UG/ML
INJECTION, SOLUTION INTRAMUSCULAR; INTRAVENOUS
Status: ACTIVE
Start: 2018-11-15 | End: 2018-11-16

## 2018-11-15 RX ADMIN — MISOPROSTOL 50 MCG: 100 TABLET ORAL at 10:49

## 2018-11-15 RX ADMIN — OXYTOCIN 6 MILLI-UNITS/MIN: 10 INJECTION, SOLUTION INTRAMUSCULAR; INTRAVENOUS at 22:27

## 2018-11-15 RX ADMIN — TRAMADOL HYDROCHLORIDE 50 MG: 50 TABLET, FILM COATED ORAL at 08:15

## 2018-11-15 RX ADMIN — OXYTOCIN 4 MILLI-UNITS/MIN: 10 INJECTION, SOLUTION INTRAMUSCULAR; INTRAVENOUS at 21:53

## 2018-11-15 RX ADMIN — LABETALOL HYDROCHLORIDE 100 MG: 100 TABLET, FILM COATED ORAL at 16:11

## 2018-11-15 RX ADMIN — MISOPROSTOL 50 MCG: 100 TABLET ORAL at 16:29

## 2018-11-15 RX ADMIN — LABETALOL HYDROCHLORIDE 100 MG: 100 TABLET, FILM COATED ORAL at 23:21

## 2018-11-15 RX ADMIN — SODIUM CHLORIDE, SODIUM LACTATE, POTASSIUM CHLORIDE, CALCIUM CHLORIDE AND DEXTROSE MONOHYDRATE 125 ML/HR: 5; 600; 310; 30; 20 INJECTION, SOLUTION INTRAVENOUS at 20:55

## 2018-11-15 RX ADMIN — OXYTOCIN 10 MILLI-UNITS/MIN: 10 INJECTION, SOLUTION INTRAMUSCULAR; INTRAVENOUS at 23:39

## 2018-11-15 RX ADMIN — MISOPROSTOL 50 MCG: 100 TABLET ORAL at 02:37

## 2018-11-15 RX ADMIN — OXYTOCIN 2 MILLI-UNITS/MIN: 10 INJECTION, SOLUTION INTRAMUSCULAR; INTRAVENOUS at 20:55

## 2018-11-15 RX ADMIN — MISOPROSTOL 50 MCG: 100 TABLET ORAL at 06:40

## 2018-11-15 RX ADMIN — LEVOTHYROXINE SODIUM 75 MCG: 50 TABLET ORAL at 08:22

## 2018-11-15 RX ADMIN — OXYTOCIN 8 MILLI-UNITS/MIN: 10 INJECTION, SOLUTION INTRAMUSCULAR; INTRAVENOUS at 23:10

## 2018-11-15 RX ADMIN — TRAMADOL HYDROCHLORIDE 50 MG: 50 TABLET, FILM COATED ORAL at 21:20

## 2018-11-15 RX ADMIN — LABETALOL HYDROCHLORIDE 100 MG: 100 TABLET, FILM COATED ORAL at 08:15

## 2018-11-15 NOTE — PROGRESS NOTES
Patient resting quietly in bed. Fernando Alves readjusted and tracing well now. No further needs at this time.

## 2018-11-15 NOTE — INTERVAL H&P NOTE
H&P Update: 
Celester Jose was seen and examined. History and physical has been reviewed. The patient has been examined. There have been no significant clinical changes since the completion of the originally dated History and Physical.  She has received cytotec overnight will continue. No severe features to her pre eclampsia. Will continue induction. Fetal heart rate reassuring.  
 
Signed By: Uriel Chairez MD   
 November 15, 2018 10:46 AM

## 2018-11-15 NOTE — PROGRESS NOTES
02:37 Medication Given miSOPROStol (CYTOTEC) tablet (prepacked) 50 mcg -  Dose: 50 mcg ; Route: Buccal ; Scheduled Time: 3728  Malachi LUKE FT/thick/-3. Patient resting quietly in bed. No further needs at this time.

## 2018-11-15 NOTE — PROGRESS NOTES
Morning assessment complete as noted Kiley adjusted Plan of care discussed Patient to call RN prn needs

## 2018-11-15 NOTE — PROGRESS NOTES
22:42 Medication Given miSOPROStol (CYTOTEC) tablet (prepacked) 50 mcg -  Dose: 50 mcg ; Route: Buccal ; Scheduled Time: 2300  Cindy LUKE 0/thick/high.

## 2018-11-15 NOTE — PROGRESS NOTES
19:30 Medication Given traMADol (ULTRAM) tablet 50 mg -  Dose: 50 mg ; Route: Oral ; Comment: for back pain 8/10  Lisbet Tao Given for back pain 8/10.

## 2018-11-15 NOTE — PROGRESS NOTES
Dr. Marquita Carrillo notified that cervical exam was difficult to reach External 1 and internal Ft Proceed with Cytotec 50mcg Have next shift check cervical exam at 2030 and call with update

## 2018-11-15 NOTE — PROGRESS NOTES
Spoke with Dr. Codi Schmidt on the phone regarding patient's POC. MD ordered to change Cytotec order to Q4hr through the night and may give fourth dose in am if patient is still c/th/high. MD also ordered PRN orders for the night. See MAR. Patient to stay on clear liquids through the night. No further orders at this time.

## 2018-11-15 NOTE — PROGRESS NOTES
23:45 Medication Given labetalol (NORMODYNE) tablet 100 mg -  Dose: 100 mg ; Route: Oral ; Scheduled Date: 11/15/18;  ; Scheduled Time: 0000  Cindy LIEBERMAN  
 
/67 P 112 Patient encouraged to rest and call out as needed. No further needs at this time.

## 2018-11-15 NOTE — PROGRESS NOTES
Shift assessment completed at this time. WDL & VSS. Patient states +FM. Patient requesting pain medication for back pain 8/10. No other needs at this time.

## 2018-11-16 PROCEDURE — 74011250636 HC RX REV CODE- 250/636: Performed by: OBSTETRICS & GYNECOLOGY

## 2018-11-16 PROCEDURE — 77030033269 HC SLV COMPR SCD KNE2 CARD -B

## 2018-11-16 PROCEDURE — 77030034696 HC CATH URETH FOL 2W BARD -A

## 2018-11-16 PROCEDURE — 65270000029 HC RM PRIVATE

## 2018-11-16 PROCEDURE — 74011250637 HC RX REV CODE- 250/637: Performed by: OBSTETRICS & GYNECOLOGY

## 2018-11-16 PROCEDURE — 77030020255 HC SOL INJ LR 1000ML BG

## 2018-11-16 PROCEDURE — 77030020254 HC SOL INJ D5LR LACTATED RINGER

## 2018-11-16 RX ORDER — GENTAMICIN SULFATE 60 MG/50ML
120 INJECTION, SOLUTION INTRAVENOUS ONCE
Status: DISCONTINUED | OUTPATIENT
Start: 2018-11-17 | End: 2018-11-17

## 2018-11-16 RX ORDER — SODIUM CHLORIDE, SODIUM LACTATE, POTASSIUM CHLORIDE, CALCIUM CHLORIDE 600; 310; 30; 20 MG/100ML; MG/100ML; MG/100ML; MG/100ML
999 INJECTION, SOLUTION INTRAVENOUS CONTINUOUS
Status: DISCONTINUED | OUTPATIENT
Start: 2018-11-17 | End: 2018-11-17

## 2018-11-16 RX ADMIN — LABETALOL HYDROCHLORIDE 100 MG: 100 TABLET, FILM COATED ORAL at 22:46

## 2018-11-16 RX ADMIN — LABETALOL HYDROCHLORIDE 100 MG: 100 TABLET, FILM COATED ORAL at 07:28

## 2018-11-16 RX ADMIN — OXYTOCIN 22 MILLI-UNITS/MIN: 10 INJECTION, SOLUTION INTRAMUSCULAR; INTRAVENOUS at 10:30

## 2018-11-16 RX ADMIN — TRAMADOL HYDROCHLORIDE 50 MG: 50 TABLET, FILM COATED ORAL at 22:46

## 2018-11-16 RX ADMIN — SODIUM CHLORIDE, SODIUM LACTATE, POTASSIUM CHLORIDE, CALCIUM CHLORIDE AND DEXTROSE MONOHYDRATE 125 ML/HR: 5; 600; 310; 30; 20 INJECTION, SOLUTION INTRAVENOUS at 04:08

## 2018-11-16 RX ADMIN — OXYTOCIN 12 MILLI-UNITS/MIN: 10 INJECTION, SOLUTION INTRAMUSCULAR; INTRAVENOUS at 01:33

## 2018-11-16 RX ADMIN — TRAMADOL HYDROCHLORIDE 50 MG: 50 TABLET, FILM COATED ORAL at 09:15

## 2018-11-16 RX ADMIN — LABETALOL HYDROCHLORIDE 100 MG: 100 TABLET, FILM COATED ORAL at 15:46

## 2018-11-16 NOTE — PROGRESS NOTES
Patient seen and examined. Per previous plan after Dr. Cantu Hands' examination this afternoon, will stop pitocin for a time and allow patient to eat and shower. Will resume ripening tonight as indicated.

## 2018-11-16 NOTE — PROGRESS NOTES
geraldine from [place], [place]. Pt assumed for care. Attempt to readjust monitor. Pt lying on her right side.  at .

## 2018-11-16 NOTE — PROGRESS NOTES
Brayan Adamson md was in to do SVE and spoke with pt about starting IV Pitocin. Pt agrees to [facility]. Per [facility] ok to check BP's q1hr overnight until pt in active labor.

## 2018-11-16 NOTE — PROGRESS NOTES
Pitocin turned off per MD orders. Pt to be off pitocin and shower and eat dinner per MD order. Pt seems happy with plan of care.

## 2018-11-16 NOTE — PROGRESS NOTES
Labor Progress Note Patient seen, fetal heart rate and contraction pattern evaluated, patient examined. Patient Vitals for the past 1 hrs: 
 BP Temp Pulse 11/15/18 1952 115/60 97.9 °F (36.6 °C) 91 Physical Exam: 
Cervical Exam:  0/(thick)/-3/Posterior Membranes:  Intact Uterine Activity: Frequency: Every 2-3 minutes Fetal Heart Rate: reassuring Assessment/Plan: 
Reassuring fetal status, continue induction will switch to pitocin as has had 6 doses of cytotec.

## 2018-11-16 NOTE — PROGRESS NOTES
From 0250 to 0330, this nurse was at bedside, readjusting patient position and the monitor, utilizing the [place] to obtain FHR. FHR heard intermittently but not tracing well.

## 2018-11-16 NOTE — PROGRESS NOTES
Problem: Nutrition Deficit Goal: *Optimize nutritional status Nutrition Follow Up: 
Pt currently NPO; in labor. Nutrition screen deferred; follow up past delivery. Becka Morrissey, [address] [place] [address], [address] [place] [address], [facility] 
[phone]

## 2018-11-16 NOTE — PROGRESS NOTES
Pt given tramadol. Offered this or stadol. Pt chose tramadol. Dr Aditya Carroll previously has seen pt and  to discuss pt's history.

## 2018-11-16 NOTE — PROGRESS NOTES
.  Labor and Delivery Shift Report - In Bedside report received from [facility]. Patient care assumed. Report included, but not limited to the following:  
Patients current intake total is 1000. Pitocin rate is currently at  25 milliunits/min. It was last increased/decreased at 1130. Last cervical exam was closed at 1200 Patient sitting up watching TV, denies any needs at this time

## 2018-11-17 LAB
ARTERIAL PATENCY WRIST A: ABNORMAL
ARTERIAL PATENCY WRIST A: NORMAL
BACTERIA SPEC CULT: NORMAL
BASE DEFICIT BLD-SCNC: 2 MMOL/L
BASE DEFICIT BLD-SCNC: 3 MMOL/L
BDY SITE: ABNORMAL
BDY SITE: NORMAL
BODY TEMPERATURE: 98.6
BODY TEMPERATURE: 98.6
GAS FLOW.O2 O2 DELIVERY SYS: ABNORMAL L/MIN
GAS FLOW.O2 O2 DELIVERY SYS: NORMAL L/MIN
HCO3 BLD-SCNC: 27.4 MMOL/L (ref 22–26)
PCO2 BLDCO: 47 MMHG (ref 32–68)
PCO2 BLDCO: 64 MMHG (ref 32–68)
PH BLDCO: 7.24 [PH] (ref 7.15–7.38)
PH BLDCO: 7.31 [PH] (ref 7.15–7.38)
PO2 BLDCO: 12 MMHG
PO2 BLDCO: 23 MMHG
SAO2 % BLD: 9 % (ref 95–98)
SERVICE CMNT-IMP: ABNORMAL
SERVICE CMNT-IMP: NORMAL
SERVICE CMNT-IMP: NORMAL
SPECIMEN TYPE: ABNORMAL
SPECIMEN TYPE: NORMAL

## 2018-11-17 PROCEDURE — 77030031139 HC SUT VCRL2 J&J -A: Performed by: OBSTETRICS & GYNECOLOGY

## 2018-11-17 PROCEDURE — 74011250636 HC RX REV CODE- 250/636: Performed by: ANESTHESIOLOGY

## 2018-11-17 PROCEDURE — 77030032490 HC SLV COMPR SCD KNE COVD -B: Performed by: OBSTETRICS & GYNECOLOGY

## 2018-11-17 PROCEDURE — 82803 BLOOD GASES ANY COMBINATION: CPT

## 2018-11-17 PROCEDURE — 74011250637 HC RX REV CODE- 250/637: Performed by: OBSTETRICS & GYNECOLOGY

## 2018-11-17 PROCEDURE — 75410000003 HC RECOV DEL/VAG/CSECN EA 0.5 HR: Performed by: OBSTETRICS & GYNECOLOGY

## 2018-11-17 PROCEDURE — 74011250636 HC RX REV CODE- 250/636

## 2018-11-17 PROCEDURE — 3E033VJ INTRODUCTION OF OTHER HORMONE INTO PERIPHERAL VEIN, PERCUTANEOUS APPROACH: ICD-10-PCS | Performed by: OBSTETRICS & GYNECOLOGY

## 2018-11-17 PROCEDURE — 74011250637 HC RX REV CODE- 250/637: Performed by: ANESTHESIOLOGY

## 2018-11-17 PROCEDURE — 77030003665 HC NDL SPN BBMI -A: Performed by: NURSE ANESTHETIST, CERTIFIED REGISTERED

## 2018-11-17 PROCEDURE — 77030033542 HC RETRCTR RETNTS PANICLS GQSM -B: Performed by: OBSTETRICS & GYNECOLOGY

## 2018-11-17 PROCEDURE — 74011000250 HC RX REV CODE- 250

## 2018-11-17 PROCEDURE — 77030034696 HC CATH URETH FOL 2W BARD -A: Performed by: OBSTETRICS & GYNECOLOGY

## 2018-11-17 PROCEDURE — 76010000391 HC C SECN FIRST 1 HR: Performed by: OBSTETRICS & GYNECOLOGY

## 2018-11-17 PROCEDURE — 65270000029 HC RM PRIVATE

## 2018-11-17 PROCEDURE — 77030018846 HC SOL IRR STRL H20 ICUM -A: Performed by: OBSTETRICS & GYNECOLOGY

## 2018-11-17 PROCEDURE — 76010000392 HC C SECN EA ADDL 0.5 HR: Performed by: OBSTETRICS & GYNECOLOGY

## 2018-11-17 PROCEDURE — 77030007880 HC KT SPN EPDRL BBMI -B: Performed by: NURSE ANESTHETIST, CERTIFIED REGISTERED

## 2018-11-17 PROCEDURE — 74011250636 HC RX REV CODE- 250/636: Performed by: OBSTETRICS & GYNECOLOGY

## 2018-11-17 PROCEDURE — 76060000078 HC EPIDURAL ANESTHESIA: Performed by: OBSTETRICS & GYNECOLOGY

## 2018-11-17 PROCEDURE — 77030018836 HC SOL IRR NACL ICUM -A: Performed by: OBSTETRICS & GYNECOLOGY

## 2018-11-17 PROCEDURE — 77030002888 HC SUT CHRMC J&J -A: Performed by: OBSTETRICS & GYNECOLOGY

## 2018-11-17 PROCEDURE — 10907ZC DRAINAGE OF AMNIOTIC FLUID, THERAPEUTIC FROM PRODUCTS OF CONCEPTION, VIA NATURAL OR ARTIFICIAL OPENING: ICD-10-PCS | Performed by: OBSTETRICS & GYNECOLOGY

## 2018-11-17 PROCEDURE — 77030020255 HC SOL INJ LR 1000ML BG

## 2018-11-17 PROCEDURE — 74011000250 HC RX REV CODE- 250: Performed by: ANESTHESIOLOGY

## 2018-11-17 PROCEDURE — 75410000002 HC LABOR FEE PER 1 HR: Performed by: OBSTETRICS & GYNECOLOGY

## 2018-11-17 RX ORDER — KETOROLAC TROMETHAMINE 30 MG/ML
30 INJECTION, SOLUTION INTRAMUSCULAR; INTRAVENOUS
Status: DISCONTINUED | OUTPATIENT
Start: 2018-11-17 | End: 2018-11-18

## 2018-11-17 RX ORDER — HYDROMORPHONE HYDROCHLORIDE 2 MG/ML
1 INJECTION, SOLUTION INTRAMUSCULAR; INTRAVENOUS; SUBCUTANEOUS
Status: DISCONTINUED | OUTPATIENT
Start: 2018-11-17 | End: 2018-11-18

## 2018-11-17 RX ORDER — NALOXONE HYDROCHLORIDE 0.4 MG/ML
0.2 INJECTION, SOLUTION INTRAMUSCULAR; INTRAVENOUS; SUBCUTANEOUS
Status: DISCONTINUED | OUTPATIENT
Start: 2018-11-17 | End: 2018-11-18

## 2018-11-17 RX ORDER — CLINDAMYCIN PHOSPHATE 900 MG/50ML
900 INJECTION INTRAVENOUS ONCE
Status: COMPLETED | OUTPATIENT
Start: 2018-11-17 | End: 2018-11-17

## 2018-11-17 RX ORDER — SODIUM CHLORIDE 9 MG/ML
50 INJECTION, SOLUTION INTRAVENOUS CONTINUOUS
Status: DISCONTINUED | OUTPATIENT
Start: 2018-11-17 | End: 2018-11-17

## 2018-11-17 RX ORDER — ONDANSETRON 2 MG/ML
INJECTION INTRAMUSCULAR; INTRAVENOUS AS NEEDED
Status: DISCONTINUED | OUTPATIENT
Start: 2018-11-17 | End: 2018-11-17 | Stop reason: HOSPADM

## 2018-11-17 RX ORDER — NALBUPHINE HYDROCHLORIDE 10 MG/ML
5 INJECTION, SOLUTION INTRAMUSCULAR; INTRAVENOUS; SUBCUTANEOUS
Status: DISCONTINUED | OUTPATIENT
Start: 2018-11-17 | End: 2018-11-18

## 2018-11-17 RX ORDER — TRISODIUM CITRATE DIHYDRATE AND CITRIC ACID MONOHYDRATE 500; 334 MG/5ML; MG/5ML
30 SOLUTION ORAL ONCE
Status: COMPLETED | OUTPATIENT
Start: 2018-11-17 | End: 2018-11-17

## 2018-11-17 RX ORDER — SODIUM CHLORIDE, SODIUM LACTATE, POTASSIUM CHLORIDE, CALCIUM CHLORIDE 600; 310; 30; 20 MG/100ML; MG/100ML; MG/100ML; MG/100ML
150 INJECTION, SOLUTION INTRAVENOUS CONTINUOUS
Status: DISCONTINUED | OUTPATIENT
Start: 2018-11-17 | End: 2018-11-17 | Stop reason: HOSPADM

## 2018-11-17 RX ORDER — GENTAMICIN SULFATE 60 MG/50ML
120 INJECTION, SOLUTION INTRAVENOUS ONCE
Status: COMPLETED | OUTPATIENT
Start: 2018-11-17 | End: 2018-11-17

## 2018-11-17 RX ORDER — SODIUM CHLORIDE, SODIUM LACTATE, POTASSIUM CHLORIDE, CALCIUM CHLORIDE 600; 310; 30; 20 MG/100ML; MG/100ML; MG/100ML; MG/100ML
150 INJECTION, SOLUTION INTRAVENOUS CONTINUOUS
Status: DISCONTINUED | OUTPATIENT
Start: 2018-11-17 | End: 2018-11-17

## 2018-11-17 RX ORDER — SODIUM CHLORIDE, SODIUM LACTATE, POTASSIUM CHLORIDE, CALCIUM CHLORIDE 600; 310; 30; 20 MG/100ML; MG/100ML; MG/100ML; MG/100ML
125 INJECTION, SOLUTION INTRAVENOUS CONTINUOUS
Status: DISCONTINUED | OUTPATIENT
Start: 2018-11-17 | End: 2018-11-18

## 2018-11-17 RX ORDER — SODIUM CHLORIDE 0.9 % (FLUSH) 0.9 %
5-10 SYRINGE (ML) INJECTION AS NEEDED
Status: DISCONTINUED | OUTPATIENT
Start: 2018-11-17 | End: 2018-11-17

## 2018-11-17 RX ORDER — DIPHENHYDRAMINE HYDROCHLORIDE 50 MG/ML
12.5 INJECTION, SOLUTION INTRAMUSCULAR; INTRAVENOUS
Status: DISCONTINUED | OUTPATIENT
Start: 2018-11-17 | End: 2018-11-18

## 2018-11-17 RX ORDER — OXYTOCIN/RINGER'S LACTATE 30/500 ML
PLASTIC BAG, INJECTION (ML) INTRAVENOUS
Status: DISCONTINUED | OUTPATIENT
Start: 2018-11-17 | End: 2018-11-17 | Stop reason: HOSPADM

## 2018-11-17 RX ORDER — ONDANSETRON 2 MG/ML
4 INJECTION INTRAMUSCULAR; INTRAVENOUS
Status: DISCONTINUED | OUTPATIENT
Start: 2018-11-17 | End: 2018-11-18

## 2018-11-17 RX ORDER — MORPHINE SULFATE 0.5 MG/ML
INJECTION, SOLUTION EPIDURAL; INTRATHECAL; INTRAVENOUS AS NEEDED
Status: DISCONTINUED | OUTPATIENT
Start: 2018-11-17 | End: 2018-11-17 | Stop reason: HOSPADM

## 2018-11-17 RX ORDER — ACETAMINOPHEN 500 MG
1000 TABLET ORAL
Status: DISCONTINUED | OUTPATIENT
Start: 2018-11-17 | End: 2018-11-18

## 2018-11-17 RX ORDER — KETOROLAC TROMETHAMINE 30 MG/ML
INJECTION, SOLUTION INTRAMUSCULAR; INTRAVENOUS AS NEEDED
Status: DISCONTINUED | OUTPATIENT
Start: 2018-11-17 | End: 2018-11-17 | Stop reason: HOSPADM

## 2018-11-17 RX ORDER — CLINDAMYCIN PHOSPHATE 900 MG/50ML
900 INJECTION INTRAVENOUS ONCE
Status: DISCONTINUED | OUTPATIENT
Start: 2018-11-17 | End: 2018-11-17

## 2018-11-17 RX ADMIN — AZITHROMYCIN MONOHYDRATE 500 MG: 500 INJECTION, POWDER, LYOPHILIZED, FOR SOLUTION INTRAVENOUS at 10:34

## 2018-11-17 RX ADMIN — FAMOTIDINE 20 MG: 10 INJECTION, SOLUTION INTRAVENOUS at 05:53

## 2018-11-17 RX ADMIN — KETOROLAC TROMETHAMINE 30 MG: 30 INJECTION, SOLUTION INTRAMUSCULAR at 19:47

## 2018-11-17 RX ADMIN — Medication 10 ML: at 00:24

## 2018-11-17 RX ADMIN — SODIUM CHLORIDE, SODIUM LACTATE, POTASSIUM CHLORIDE, AND CALCIUM CHLORIDE: 600; 310; 30; 20 INJECTION, SOLUTION INTRAVENOUS at 07:12

## 2018-11-17 RX ADMIN — SODIUM CITRATE AND CITRIC ACID MONOHYDRATE 30 ML: 500; 334 SOLUTION ORAL at 06:28

## 2018-11-17 RX ADMIN — NALBUPHINE HYDROCHLORIDE 5 MG: 10 INJECTION, SOLUTION INTRAMUSCULAR; INTRAVENOUS; SUBCUTANEOUS at 10:17

## 2018-11-17 RX ADMIN — POLYMYXIN B SULFATE, BACITRACIN ZINC, NEOMYCIN SULFATE: 5000; 3.5; 4 OINTMENT TOPICAL at 00:23

## 2018-11-17 RX ADMIN — ONDANSETRON 4 MG: 2 INJECTION INTRAMUSCULAR; INTRAVENOUS at 07:58

## 2018-11-17 RX ADMIN — KETOROLAC TROMETHAMINE 30 MG: 30 INJECTION, SOLUTION INTRAMUSCULAR at 13:59

## 2018-11-17 RX ADMIN — CLINDAMYCIN PHOSPHATE 900 MG: 900 INJECTION, SOLUTION INTRAVENOUS at 05:50

## 2018-11-17 RX ADMIN — GENTAMICIN SULFATE 120 MG: 60 INJECTION, SOLUTION INTRAVENOUS at 06:40

## 2018-11-17 RX ADMIN — HYDROMORPHONE HYDROCHLORIDE 1 MG: 2 INJECTION, SOLUTION INTRAMUSCULAR; INTRAVENOUS; SUBCUTANEOUS at 17:52

## 2018-11-17 RX ADMIN — SODIUM CHLORIDE, SODIUM LACTATE, POTASSIUM CHLORIDE, AND CALCIUM CHLORIDE 125 ML/HR: 600; 310; 30; 20 INJECTION, SOLUTION INTRAVENOUS at 11:30

## 2018-11-17 RX ADMIN — LABETALOL HYDROCHLORIDE 100 MG: 100 TABLET, FILM COATED ORAL at 23:37

## 2018-11-17 RX ADMIN — KETOROLAC TROMETHAMINE 30 MG: 30 INJECTION, SOLUTION INTRAMUSCULAR; INTRAVENOUS at 08:12

## 2018-11-17 RX ADMIN — LABETALOL HYDROCHLORIDE 100 MG: 100 TABLET, FILM COATED ORAL at 16:11

## 2018-11-17 RX ADMIN — HYDROMORPHONE HYDROCHLORIDE 1 MG: 2 INJECTION, SOLUTION INTRAMUSCULAR; INTRAVENOUS; SUBCUTANEOUS at 13:59

## 2018-11-17 RX ADMIN — MORPHINE SULFATE 250 MCG: 0.5 INJECTION, SOLUTION EPIDURAL; INTRATHECAL; INTRAVENOUS at 07:31

## 2018-11-17 RX ADMIN — POLYMYXIN B SULFATE, BACITRACIN ZINC, NEOMYCIN SULFATE: 5000; 3.5; 4 OINTMENT TOPICAL at 10:20

## 2018-11-17 RX ADMIN — TRAMADOL HYDROCHLORIDE 50 MG: 50 TABLET, FILM COATED ORAL at 23:37

## 2018-11-17 RX ADMIN — TRAMADOL HYDROCHLORIDE 50 MG: 50 TABLET, FILM COATED ORAL at 11:29

## 2018-11-17 RX ADMIN — Medication 500 ML/HR: at 07:58

## 2018-11-17 RX ADMIN — SODIUM CHLORIDE, SODIUM LACTATE, POTASSIUM CHLORIDE, AND CALCIUM CHLORIDE 1000 ML: 600; 310; 30; 20 INJECTION, SOLUTION INTRAVENOUS at 06:06

## 2018-11-17 NOTE — ANESTHESIA PREPROCEDURE EVALUATION
Anesthetic History History of awareness of surgery under anesthesia (during tonsilectomy and spine surgery) Comments: Pt states that local anesthetic doesn't numb her as expected. Has had problems with that for chronic pain procedures including nerve blocks and epidurals. 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 
  
 
 
Renal disease: stones Endo/Other Hypothyroidism Morbid obesity (super) and arthritis Comments: PCOS Other Findings Comments: Chronic pain MTHFR- hypercoagulable state, pt taking two baby ASA daily. Physical Exam 
 
Airway Mallampati: I 
TM Distance: 4 - 6 cm Neck ROM: normal range of motion Mouth opening: Normal 
 
Comments: Tongue ring Cardiovascular Regular rate and rhythm,  S1 and S2 normal,  no murmur, click, rub, or gallop Dental 
No notable dental hx Pulmonary Breath sounds clear to auscultation Abdominal 
GI exam deferred Other Findings Anesthetic Plan ASA: 3 Anesthesia type: epidural 
 
 
 
 
 
Anesthetic plan and risks discussed with: Patient and Spouse Uterine fibroid tumor was pressing on cervix earlier in pregnancy but has since moved, allowing for trial of labor. I discussed both DENISSE for labor and SAB if plan becomes . I explained R/B/A and she understands and agrees to proceed.

## 2018-11-17 NOTE — PROGRESS NOTES
Flako Gruber updated that this RN has been in room for over an hr and a half trying to get a FHR trace and as unable to obtain a continuous trace have been unable to start Pitocin. Have tried having Sidney GRUBER located FHR with ultrasound (FHR located on ultrasound, but unable to continuously trace with monitor UA). Have also tried using a 'belly band' to hold monitors in place as well as an external handheld doppler, able to obtain FHR in the 140's with very brief spotty . Unable to use the 'Kiley' monitor as pt's skin where [facility] monitor was prior is very irritated. Flako Gruber to come and speak with pt about induction and  options.

## 2018-11-17 NOTE — PROGRESS NOTES
Patient counseled regarding risks of surgery including but not limited to hemorrhage during or after surgery which may result in further surgeries, blood transfusions or death; infection possibly requiring readmission to the hospital and or prolonged antibiotics use or further surgeries to correct; injury to adjacent abdominal structures, bladder or kidney tubes which may require another readmission and or more extensive surgeries to correct. She voices understanding these and other risks as described to her and a willingness to proceed.

## 2018-11-17 NOTE — PROGRESS NOTES
In to give pt PRN dose of Tylenol PO per MAR. Pt declines stating \"tylenol does not metabolize well in my system\" and she prefers to not take it. Educated pt on what medications were available to her. Pt will hold on any pain medication for the time being.

## 2018-11-17 NOTE — ANESTHESIA PROCEDURE NOTES
Spinal Block Start time: 11/17/2018 7:36 AM 
End time: 11/17/2018 7:44 AM 
Performed by: Satish Licona MD 
Authorized by: Satish Licona MD  
 
Pre-procedure: Indications: primary anesthetic  Preanesthetic Checklist: patient identified, risks and benefits discussed, anesthesia consent, patient being monitored and timeout performed Timeout Time: 07:36 Spinal Block:  
Patient Position:  Seated Prep Region:  Lumbar Prep: chlorhexidine Location:  L2-3 Technique:  Single shot Local Dose (mL):  3 Needle:  
Needle Type:  Pencan Needle Gauge:  25 G Attempts:  2 (5 inch) Events: CSF confirmed, no blood with aspiration and no paresthesia Assessment: 
Insertion:  Uncomplicated Patient tolerance:  Patient tolerated the procedure well with no immediate complications All needles out intact, procedure tolerated well without problems

## 2018-11-17 NOTE — PROGRESS NOTES
Per [facility] Md plan for  at aprox 0700, no need for further fetal monitoring or BP checks overnight. Pt to be NPO and will start IV fluids just prior to .

## 2018-11-17 NOTE — PROGRESS NOTES
Notified by RN of skin sensitivity from [facility] HAMLET and difficulty monitoring patient with conventional monitors. Opportunity taken to reiterate concerns over lack of progress now over 48 hours into IOL process. Now with difficulty monitoring, induction efforts further hindered due to safety. I have recommended a  at this point due to failed induction and now difficulty monitoring the baby. She and her  are considering these notions. <<-----Click here for Discharge Medication Review

## 2018-11-17 NOTE — PROGRESS NOTES
Brought breast pump and kit to pt's room to start pumping. Pt states she would like to wait for pain meds to take effect before she starts to pump. Educated pt to notify RN when she is ready. Pt verbalized understanding.

## 2018-11-17 NOTE — PROGRESS NOTES
Pt with areas of irritation from [facility] fetal monitor, aprox 4 one inch circular areas around umbilical area of red skin with small abraded areas. No areas of irritation near where  incision will occur.

## 2018-11-17 NOTE — L&D DELIVERY NOTE
Delivery Summary    Patient: Master Corona MRN: [id number]  SSN: [id number]    YOB: 1985  Age: 34 y.o. Sex: female        Information for the patient's :  Jania Tamayo [[id number]]       Labor Events:    Labor: No   Rupture Date: 2018   Rupture Time: 7:56 AM   Rupture Type AROM   Amniotic Fluid Volume: Moderate    Amniotic Fluid Description: Clear None   Induction: Oxytocin;Prostaglandins       Augmentation: None   Labor Events: Failure to Progress in First Stage; Other (comment)     Cervical Ripening:     Misoprostol     Delivery Events:  Episiotomy: None   Laceration(s):       Repaired:      Number of Repair Packets:     Suture Type and Size:       Estimated Blood Loss (ml):  ml       Delivery Date: 2018    Delivery Time: 7:56 AM  Delivery Type: , Low Transverse   Details    Trial of Labor: Yes   Primary/Repeat: Primary   Priority: Routine   Indications:  Failure to Progress; Other (Add Comments)   Incision type:     Sex:  Male     Gestational Age: 42w2d  Delivery Clinician:  Jaylin Lemos  Living Status: Living   Delivery Location: OR            APGARS  One minute Five minutes Ten minutes   Skin color: 0   0        Heart rate: 2   2        Grimace: 2   2        Muscle tone: 2   0        Breathin   2        Totals: 8   6          Presentation: Vertex    Position:        Resuscitation Method:  Suctioning-bulb; Tactile Stimulation;C-PAP     Meconium Stained: None      Cord Information: 3 Vessels  Complications: None  Cord around:    Delayed cord clamping? No  Cord clamped date/time:   Disposition of Cord Blood:      Blood Gases Sent?: Yes    Placenta:  Date/Time: 2018  7:56 AM  Removal: Expressed      Appearance: Normal     [place] Measurements:  Birth Weight:        Birth Length:        Head Circumference:        Chest Circumference:       Abdominal Girth:       Other Providers:   Angelo Thorpe YUMIKO;LILIAN HERNANDEZ;Jenni CHRISTIE PATRICIA;ELA ANTONIO;DAYDAY AMAYA;MEG JOSE;NAUN MARTIN, Obstetrician;Obstetrician;Primary [place] Nurse;Neonatologist;Anesthesiologist;Crna;Respiratory Therapist;Nursery Nurse             Group B Strep:   Lab Results   Component Value Date/Time    GrBStrep, External negative 2018 05:25 PM     Information for the patient's :  Onesimomanjeet Graham [[id number]]   No results found for: Ileana Lopez, ABORHEXT, ABORH    Recent Labs     18  0821 18  0820   PCO2CB 52 64   PO2CB 23 12   HCO3I  --  27.4*   SO2I  --  9*   IBD 3 2   PTEMPI 98.6 98.6   SPECTI VENOUS CORD ARTERIAL CORD   PHICB 7.308 7.237   ISITE CORD CORD   IDEV ROOM AIR ROOM AIR   IALLEN NOT APPLICABLE NOT APPLICABLE

## 2018-11-17 NOTE — PROGRESS NOTES
SBAR OUT Report: Mother Verbal report given to [facility] (full name & credentials) on this patient, who is now being transferred to MIU (unit) for routine progression of care. The patient is wearing a green \"Anesthesia-Duramorph\" band. Report consisted of patient's Situation, Background, Assessment and Recommendations (SBAR). [place] ID bands were compared with the identification form, and verified with the patient and receiving nurse. Information from the SBAR and the [facility] Report was reviewed with the receiving nurse; opportunity for questions and clarification provided.

## 2018-11-17 NOTE — ANESTHESIA POSTPROCEDURE EVALUATION
Procedure(s):  SECTION. Anesthesia Post Evaluation Patient location during evaluation: PACU Patient participation: complete - patient participated Level of consciousness: responsive to verbal stimuli Pain management: adequate Airway patency: patent Anesthetic complications: no 
Cardiovascular status: acceptable Respiratory status: acceptable Hydration status: euvolemic Visit Vitals /83 Pulse 80 Temp 37 °C (98.6 °F) Resp 20 Wt 127.9 kg (282 lb) SpO2 98% Breastfeeding? Unknown BMI 53.28 kg/m²

## 2018-11-17 NOTE — OP NOTES
[facility]  [id number]      INTRAUTERINE PREGNANCY  SECTION FULL OP NOTE        DATE OF PROCEDURE:  2018    PREOPERATIVE DIAGNOSIS:  Failure to Progress, 37 2/7, CHTN with superimposed PEC, Uterine fibroids    POSTOPERATIVE DIAGNOSIS:  S/A with viable baby boy     ADDITIONAL DIAGNOSES: Obstructive uterine fibroid in lower uterine segment    PROCEDURE: Low transverse  section    SURGEON:  Corina Sales MD    ASSISTANT:  Molly Gomez DO    ANESTHESIA: Spinal    EBL: 132 cc    COMPLICATIONS: none    OPERATIVE PROCEDURE: Patient was placed on the operating room table in the supine position/left lateral tilt. Time out was done to confirm the operating procedure, surgeon, patient and site. Once confirmed by the team, procedure was started. After having adequate regional anesthesia by spinal injection, the patient was prepped and draped in the usual fashion for abdominal surgery. A Pfannenstiel incision was made and carried down sharply through the skin, subcutaneous tissue, and fascia. Fascia was sharply dissected free from underlying rectus muscles. The peritoneum was sharply entered and extended vertically. DeLee bladder blade was then placed over the bladder. The visceroperitoneal reflection over the lower uterine segment was incised transversely and the bladder flap sharply and bluntly developed. The uterus was incised transversely, then extended bluntly, and the infants head was delivered without difficulty and fundal pressure. Fluid was clear. Cord was clamped and cut. Mouth and nose were suctioned clean. The infant was given to the NICU personnel present at the time of delivery. The placenta was expressed, intact on inspection. The uterus was exteriorized, wrapped in a wet lap square, curetted with a dry square, and closed in a double-layered fashion with #1 chromic. Hemostasis appeared adequate. The cul-de-sac was then irrigated and suctioned clean.  The uterus was placed in the abdomen. The gutters were irrigated and suctioned clean. The peritoneum and rectus muscles were closed with 2-0 chromic. The fascia was closed with 0 vicryl. Running 2-0 plain was used to reapproximate the subcutaneous tissue. 4-0 Vicryl was used in a subcuticular stitch. The patient tolerated the procedure well and went to the recovery room in satisfactory condition.

## 2018-11-17 NOTE — PROGRESS NOTES
Pt states she is still not ready to pump. Educated pt that it is best to pump within 6 hours of delivery. Pt verbalized understanding. States she will notify RN when ready.

## 2018-11-17 NOTE — PROGRESS NOTES
Very difficult to monitor baby, Sidney Mathews called to room to assist with locating FHR with ultrasound.

## 2018-11-17 NOTE — PROGRESS NOTES
Encouraged pt to drink more water due to urine being concentrated. Pt verbalized understanding. Pt states interest in pumping, but she is not ready to pump yet.

## 2018-11-18 LAB
ABO + RH BLD: NORMAL
BASOPHILS # BLD: 0.1 K/UL (ref 0–0.2)
BASOPHILS NFR BLD: 1 % (ref 0–2)
BLD PROD TYP BPU: NORMAL
BLOOD GROUP ANTIBODIES SERPL: NORMAL
BPU ID: NORMAL
CROSSMATCH RESULT,%XM: NORMAL
DIFFERENTIAL METHOD BLD: ABNORMAL
EOSINOPHIL # BLD: 0.2 K/UL (ref 0–0.8)
EOSINOPHIL NFR BLD: 2 % (ref 0.5–7.8)
ERYTHROCYTE [DISTWIDTH] IN BLOOD BY AUTOMATED COUNT: 15.8 %
HCT VFR BLD AUTO: 32.6 % (ref 35.8–46.3)
HGB BLD-MCNC: 9.9 G/DL (ref 11.7–15.4)
IMM GRANULOCYTES # BLD: 0.1 K/UL (ref 0–0.5)
IMM GRANULOCYTES NFR BLD AUTO: 1 % (ref 0–5)
LYMPHOCYTES # BLD: 3 K/UL (ref 0.5–4.6)
LYMPHOCYTES NFR BLD: 24 % (ref 13–44)
MCH RBC QN AUTO: 25.8 PG (ref 26.1–32.9)
MCHC RBC AUTO-ENTMCNC: 30.4 G/DL (ref 31.4–35)
MCV RBC AUTO: 85.1 FL (ref 79.6–97.8)
MONOCYTES # BLD: 1.1 K/UL (ref 0.1–1.3)
MONOCYTES NFR BLD: 8 % (ref 4–12)
NEUTS SEG # BLD: 8.3 K/UL (ref 1.7–8.2)
NEUTS SEG NFR BLD: 65 % (ref 43–78)
NRBC # BLD: 0 K/UL (ref 0–0.2)
PLATELET # BLD AUTO: 265 K/UL (ref 150–450)
PMV BLD AUTO: 10.2 FL (ref 9.4–12.3)
RBC # BLD AUTO: 3.83 M/UL (ref 4.05–5.2)
SPECIMEN EXP DATE BLD: NORMAL
STATUS OF UNIT,%ST: NORMAL
UNIT DIVISION, %UDIV: 0
WBC # BLD AUTO: 12.7 K/UL (ref 4.3–11.1)

## 2018-11-18 PROCEDURE — 36415 COLL VENOUS BLD VENIPUNCTURE: CPT

## 2018-11-18 PROCEDURE — 74011250636 HC RX REV CODE- 250/636: Performed by: ANESTHESIOLOGY

## 2018-11-18 PROCEDURE — 74011250637 HC RX REV CODE- 250/637: Performed by: OBSTETRICS & GYNECOLOGY

## 2018-11-18 PROCEDURE — 65270000029 HC RM PRIVATE

## 2018-11-18 PROCEDURE — 85025 COMPLETE CBC W/AUTO DIFF WBC: CPT

## 2018-11-18 RX ORDER — HYDROCODONE BITARTRATE AND ACETAMINOPHEN 7.5; 325 MG/1; MG/1
2 TABLET ORAL
Status: DISCONTINUED | OUTPATIENT
Start: 2018-11-18 | End: 2018-11-20 | Stop reason: HOSPADM

## 2018-11-18 RX ORDER — DIPHENHYDRAMINE HCL 25 MG
25 CAPSULE ORAL
Status: DISCONTINUED | OUTPATIENT
Start: 2018-11-18 | End: 2018-11-20 | Stop reason: HOSPADM

## 2018-11-18 RX ORDER — ZOLPIDEM TARTRATE 5 MG/1
5 TABLET ORAL
Status: DISCONTINUED | OUTPATIENT
Start: 2018-11-18 | End: 2018-11-20 | Stop reason: HOSPADM

## 2018-11-18 RX ORDER — NALOXONE HYDROCHLORIDE 0.4 MG/ML
0.4 INJECTION, SOLUTION INTRAMUSCULAR; INTRAVENOUS; SUBCUTANEOUS AS NEEDED
Status: DISCONTINUED | OUTPATIENT
Start: 2018-11-18 | End: 2018-11-20 | Stop reason: HOSPADM

## 2018-11-18 RX ORDER — LANOLIN ALCOHOL/MO/W.PET/CERES
1 CREAM (GRAM) TOPICAL
Status: DISCONTINUED | OUTPATIENT
Start: 2018-11-19 | End: 2018-11-20 | Stop reason: HOSPADM

## 2018-11-18 RX ORDER — SIMETHICONE 80 MG
80 TABLET,CHEWABLE ORAL
Status: DISCONTINUED | OUTPATIENT
Start: 2018-11-18 | End: 2018-11-20 | Stop reason: HOSPADM

## 2018-11-18 RX ORDER — SODIUM CHLORIDE 0.9 % (FLUSH) 0.9 %
5-10 SYRINGE (ML) INJECTION AS NEEDED
Status: DISCONTINUED | OUTPATIENT
Start: 2018-11-18 | End: 2018-11-18

## 2018-11-18 RX ORDER — HYDROCODONE BITARTRATE AND ACETAMINOPHEN 7.5; 325 MG/1; MG/1
1 TABLET ORAL
Status: DISCONTINUED | OUTPATIENT
Start: 2018-11-18 | End: 2018-11-20 | Stop reason: HOSPADM

## 2018-11-18 RX ORDER — IBUPROFEN 800 MG/1
800 TABLET ORAL
Status: DISCONTINUED | OUTPATIENT
Start: 2018-11-18 | End: 2018-11-20 | Stop reason: HOSPADM

## 2018-11-18 RX ORDER — SODIUM CHLORIDE 0.9 % (FLUSH) 0.9 %
5-10 SYRINGE (ML) INJECTION EVERY 8 HOURS
Status: DISCONTINUED | OUTPATIENT
Start: 2018-11-18 | End: 2018-11-18

## 2018-11-18 RX ORDER — DOCUSATE SODIUM 100 MG/1
100 CAPSULE, LIQUID FILLED ORAL 2 TIMES DAILY
Status: DISCONTINUED | OUTPATIENT
Start: 2018-11-18 | End: 2018-11-20 | Stop reason: HOSPADM

## 2018-11-18 RX ORDER — SODIUM CHLORIDE, SODIUM LACTATE, POTASSIUM CHLORIDE, CALCIUM CHLORIDE 600; 310; 30; 20 MG/100ML; MG/100ML; MG/100ML; MG/100ML
150 INJECTION, SOLUTION INTRAVENOUS CONTINUOUS
Status: ACTIVE | OUTPATIENT
Start: 2018-11-18 | End: 2018-11-18

## 2018-11-18 RX ADMIN — IBUPROFEN 800 MG: 800 TABLET ORAL at 21:58

## 2018-11-18 RX ADMIN — IBUPROFEN 800 MG: 800 TABLET ORAL at 08:30

## 2018-11-18 RX ADMIN — LEVOTHYROXINE SODIUM 75 MCG: 50 TABLET ORAL at 08:30

## 2018-11-18 RX ADMIN — IBUPROFEN 800 MG: 800 TABLET ORAL at 14:58

## 2018-11-18 RX ADMIN — HYDROCODONE BITARTRATE AND ACETAMINOPHEN 2 TABLET: 7.5; 325 TABLET ORAL at 06:07

## 2018-11-18 RX ADMIN — HYDROCODONE BITARTRATE AND ACETAMINOPHEN 2 TABLET: 7.5; 325 TABLET ORAL at 19:36

## 2018-11-18 RX ADMIN — HYDROCODONE BITARTRATE AND ACETAMINOPHEN 2 TABLET: 7.5; 325 TABLET ORAL at 10:10

## 2018-11-18 RX ADMIN — IBUPROFEN 800 MG: 800 TABLET ORAL at 02:16

## 2018-11-18 RX ADMIN — HYDROCODONE BITARTRATE AND ACETAMINOPHEN 2 TABLET: 7.5; 325 TABLET ORAL at 23:24

## 2018-11-18 RX ADMIN — HYDROCODONE BITARTRATE AND ACETAMINOPHEN 2 TABLET: 7.5; 325 TABLET ORAL at 02:17

## 2018-11-18 RX ADMIN — SIMETHICONE CHEW TAB 80 MG 80 MG: 80 TABLET ORAL at 17:03

## 2018-11-18 RX ADMIN — DOCUSATE SODIUM 100 MG: 100 CAPSULE, LIQUID FILLED ORAL at 08:30

## 2018-11-18 RX ADMIN — SIMETHICONE CHEW TAB 80 MG 80 MG: 80 TABLET ORAL at 14:58

## 2018-11-18 RX ADMIN — LABETALOL HYDROCHLORIDE 100 MG: 100 TABLET, FILM COATED ORAL at 14:57

## 2018-11-18 RX ADMIN — SIMETHICONE CHEW TAB 80 MG 80 MG: 80 TABLET ORAL at 08:30

## 2018-11-18 RX ADMIN — LABETALOL HYDROCHLORIDE 100 MG: 100 TABLET, FILM COATED ORAL at 23:24

## 2018-11-18 RX ADMIN — SIMETHICONE CHEW TAB 80 MG 80 MG: 80 TABLET ORAL at 23:24

## 2018-11-18 RX ADMIN — DOCUSATE SODIUM 100 MG: 100 CAPSULE, LIQUID FILLED ORAL at 17:03

## 2018-11-18 RX ADMIN — HYDROCODONE BITARTRATE AND ACETAMINOPHEN 2 TABLET: 7.5; 325 TABLET ORAL at 14:58

## 2018-11-18 NOTE — PROGRESS NOTES
Patient is POD 1 s/p  and received neuraxial morphine for post-op pain control. Visit Vitals /78 (BP 1 Location: Left arm, BP Patient Position: At rest) Pulse 93 Temp 36.7 °C (98.1 °F) Resp 18 Wt 127.9 kg (282 lb) Comment: from prenatal records LMP 2018 SpO2 97% Breastfeeding? Unknown BMI 53.28 kg/m²  
, airway patent, patient appropriately hydrated and appears euvolemic. She reports minimal incisional pain. Patient reports moderate pruritis, which resolved several hours ago  No nausea. Her lower extremities have returned to baseline neurologically. She was satisfied with the anesthetic and reports no complications. Continue current orders.

## 2018-11-18 NOTE — PROGRESS NOTES
Assessment complete per Doc Flowsheet. WNL. Motrin given po for pain 7/10 per patient request.  Scheduled Colace, Synthroid and Chewable Mylicon given po.

## 2018-11-18 NOTE — LACTATION NOTE
Spoke to mom in the nursery where she was visiting her infant. She started pumping early this am. She has now pumped three times. She expressed zero at first pumping, I ml at second and then drops at last pumping. Informed mom this is normal and not to get discouraged. She said at this time she has no questions or concerns.

## 2018-11-18 NOTE — PROGRESS NOTES
Post-Operative Day Number 1 Progress Note Patient doing well post-op day 1 from  delivery without significant complaints. Pain controlled on current medication. Voiding without difficulty, normal lochia. Pt asleep in chair in NICU- infant with respiratory issues Vitals:   
Patient Vitals for the past 8 hrs: 
 BP Temp Pulse Resp SpO2  
18 0700 125/67 98.1 °F (36.7 °C) 94 18 96 % 18 0604 110/73 98.1 °F (36.7 °C) 85 18  Temp (24hrs), Av.5 °F (36.9 °C), Min:98.1 °F (36.7 °C), Max:99.1 °F (37.3 °C) Vital signs stable, afebrile. Exam:  Patient without distress. Abdomen soft, fundus firm at level of umbilicus, non tender. Incision dry and clean without erythema. Lower extremities are negative for cords or tenderness. + edema Lab/Data Review: CBC:  
Lab Results Component Value Date/Time WBC 12.7 (H) 2018 09:00 AM  
 HGB 9.9 (L) 2018 09:00 AM  
 HCT 32.6 (L) 2018 09:00 AM  
  2018 09:00 AM  
 
 
Assessment and Plan:  Patient appears to be having uncomplicated post- course. Continue routine post-op care and maternal education. Start iron

## 2018-11-18 NOTE — PROGRESS NOTES
Problem: Nutrition Deficit Goal: *Optimize nutritional status Reason for Assessment: f/u antepartum for LOS Diet order(s):  Regular Pt admitted on 18 with pre eclampsia. Per progress note, patient doing well post-op day 1 from  delivery without significant complaints. Pt is planning to breastfeed infant. Pt not in room at time of visit for account of po intake. Lance nutrition score of 4's (excellent). Anthropometrics:  Height: 5'1\"  Weight: 127.9 kg (282 lb)(from prenatal records), BMI: 53.3 
   
Macronutrient needs: EER:  ~ 1808- 2135 kcal /day (12-15 kcal/kg pre pregnant weight of ~ 109 kg (240#) + 500 kcal for lactation) EPR:  ~ 73-88 grams protein/day (1-1.2 grams/kg pre pregnant IBW) Intake/Comparative Standards:  No recorded intake. Lance nutrition assessment score of 4's indicate excellent intake. Regular diet order provides potentially 100% of estimated nutritional needs. Nutrition Diagnosis:  No nutrition diagnosis at this time. Intervention: 
Meals and snacks: Continue current diet. Discharge nutrition plan: Too soon to determine. No needs indicated at this time. José Oden, MS, RD, CSSD, LD 
W: [phone] C: [id number] Problem: Falls - Risk of 
Goal: *Absence of Falls Document Gracy Randhawa Fall Risk and appropriate interventions in the flowsheet. Fall Risk Interventions: 
  
 
  
 
Medication Interventions: Patient to call before getting OOB, Teach patient to arise slowly

## 2018-11-18 NOTE — PROGRESS NOTES
Assessment completed as noted, see doc flowsheet. Lenz cath removed with cath tip intact. 250ml clear yellow urine emptied from bag upon removal.  IV fluids discontinued. Assisted pt out of bed to bathroom. Linens and gown changed. Katrina care taught and performed by pt. Pt was unable to void at this time. Educated pt on need to void within 6 hours of lenz removed, encouraged increased fluid intake, pt verbalized understanding. Assisted pt to wheelchair and taken to SCN to visit infant. Tolerated well. Will continue to monitor.

## 2018-11-18 NOTE — PROGRESS NOTES
Encouraged pt to begin pumping. Pt states she would like to wait until gotten out of bed and visited infant. Educated pt on importance of early pumping to stimulate milk production in absence of infant at the breast.  Pt continues to desire to wait until later to begin.

## 2018-11-18 NOTE — PROGRESS NOTES
Pt lying quietly in bed, encouraged pt to get out of bed to attempt to void. Pt states she would like to eat dinner first.  Educated pt on importance of attempting to void prior to bladder getting full, pt states she will get up after eating dinner.

## 2018-11-18 NOTE — PROGRESS NOTES
Patients IV infiltrated. Pt refused new IV placement. Dr. Cherise Gregory notified, orders to switch to PO medications / OB orders and not replace IV.

## 2018-11-18 NOTE — PROGRESS NOTES
Breast pump and kit provided, set up, and pt educated on use. Assisted pt to begin pumping. Encouraged pt to pump for 15 minutes and call out for assistance with collection of colostrum upon completion. Pt verbalized understanding.

## 2018-11-19 PROCEDURE — 74011250637 HC RX REV CODE- 250/637: Performed by: OBSTETRICS & GYNECOLOGY

## 2018-11-19 PROCEDURE — 65270000029 HC RM PRIVATE

## 2018-11-19 RX ORDER — LABETALOL 200 MG/1
200 TABLET, FILM COATED ORAL EVERY 8 HOURS
Status: DISCONTINUED | OUTPATIENT
Start: 2018-11-19 | End: 2018-11-20 | Stop reason: HOSPADM

## 2018-11-19 RX ADMIN — HYDROCODONE BITARTRATE AND ACETAMINOPHEN 2 TABLET: 7.5; 325 TABLET ORAL at 21:49

## 2018-11-19 RX ADMIN — HYDROCODONE BITARTRATE AND ACETAMINOPHEN 2 TABLET: 7.5; 325 TABLET ORAL at 08:47

## 2018-11-19 RX ADMIN — SIMETHICONE CHEW TAB 80 MG 80 MG: 80 TABLET ORAL at 12:21

## 2018-11-19 RX ADMIN — HYDROCODONE BITARTRATE AND ACETAMINOPHEN 2 TABLET: 7.5; 325 TABLET ORAL at 03:15

## 2018-11-19 RX ADMIN — SIMETHICONE CHEW TAB 80 MG 80 MG: 80 TABLET ORAL at 21:49

## 2018-11-19 RX ADMIN — IBUPROFEN 800 MG: 800 TABLET ORAL at 12:20

## 2018-11-19 RX ADMIN — HYDROCODONE BITARTRATE AND ACETAMINOPHEN 2 TABLET: 7.5; 325 TABLET ORAL at 12:21

## 2018-11-19 RX ADMIN — IBUPROFEN 800 MG: 800 TABLET ORAL at 21:49

## 2018-11-19 RX ADMIN — SIMETHICONE CHEW TAB 80 MG 80 MG: 80 TABLET ORAL at 08:51

## 2018-11-19 RX ADMIN — DOCUSATE SODIUM 100 MG: 100 CAPSULE, LIQUID FILLED ORAL at 21:49

## 2018-11-19 RX ADMIN — LABETALOL HYDROCHLORIDE 100 MG: 100 TABLET, FILM COATED ORAL at 08:51

## 2018-11-19 RX ADMIN — DOCUSATE SODIUM 100 MG: 100 CAPSULE, LIQUID FILLED ORAL at 08:51

## 2018-11-19 RX ADMIN — IBUPROFEN 800 MG: 800 TABLET ORAL at 04:07

## 2018-11-19 RX ADMIN — FERROUS SULFATE TAB 325 MG (65 MG ELEMENTAL FE) 325 MG: 325 (65 FE) TAB at 08:51

## 2018-11-19 RX ADMIN — LABETALOL HCL 200 MG: 200 TABLET, FILM COATED ORAL at 15:56

## 2018-11-19 RX ADMIN — SIMETHICONE CHEW TAB 80 MG 80 MG: 80 TABLET ORAL at 15:56

## 2018-11-19 RX ADMIN — HYDROCODONE BITARTRATE AND ACETAMINOPHEN 2 TABLET: 7.5; 325 TABLET ORAL at 15:56

## 2018-11-19 NOTE — PROGRESS NOTES
Pt calls out requesting pain medication for verbalized pain level of 10/10. 800mg Motrin administered PO. Pt name, , and allergies verified before administration. Primary nurse notified.

## 2018-11-19 NOTE — LACTATION NOTE
In to see mom in her room earlier and set  Up time to meet her in the nursery. Assisted mom with a feeding and mom had several different nipple shields that she wanted to be fitted to. Chose the 16mm Medela nippleshield and instructed her on the use of the shield and how to clean it. Infant did latch and suck briefly on mom's right breast in cross cradle hold. We then applied the nippleshield and infant latched and nursed rhythmically foe several minutes. I then assisted mom with pumping and instructed her on how to use the initiation phase and to increase the suction up to 3. Did note that mom was expressing a drop. Mom has a personal breast pump at home but I also reviewed with her the option to rental a hospital grade pump at discharge if she would like. She stated that she plans to be here most of the time so she will probably just use her personal breast pump at home. Mom knows to request lactation consultant assistance as needed.

## 2018-11-19 NOTE — PROGRESS NOTES
Shift assessment complete as noted. Patient request pain medicine. Patient given [place]annah PO for patient reported pain 9/10 in back. See MAR. Questions encouraged and answered. Encouraged to call for needs or concerns. Verbalizes understanding. Family at bedside.

## 2018-11-19 NOTE — PROGRESS NOTES
Post-Operative Day Number 2 Progress Note Patient doing well post-op day 2 from  delivery without significant complaints. Pain controlled on current medication. Voiding without difficulty, normal lochia. Vitals:   
Patient Vitals for the past 8 hrs: 
 BP Temp Pulse Resp SpO2  
18 0851 (!) 167/94  91    
18 0705 (!) 138/94 97.8 °F (36.6 °C) 89 18 96 % 18 0510 (!) 137/93 97.9 °F (36.6 °C) 100 17 96 % Temp (24hrs), Av.9 °F (36.6 °C), Min:97.8 °F (36.6 °C), Max:98.1 °F (36.7 °C) Vital signs stable, afebrile. Exam:  Patient without distress. Abdomen soft, fundus firm at level of umbilicus, non tender. Incision dry and clean without erythema. Lower extremities are negative for swelling, cords or tenderness. Lab/Data Review: All lab results for the last 24 hours reviewed. Assessment and Plan:  Patient appears to be having uncomplicated post- course. Continue routine post-op care and maternal education.

## 2018-11-20 VITALS
HEART RATE: 91 BPM | DIASTOLIC BLOOD PRESSURE: 83 MMHG | TEMPERATURE: 98.6 F | RESPIRATION RATE: 14 BRPM | OXYGEN SATURATION: 100 % | BODY MASS INDEX: 53.28 KG/M2 | SYSTOLIC BLOOD PRESSURE: 126 MMHG | WEIGHT: 282 LBS

## 2018-11-20 PROCEDURE — 74011000250 HC RX REV CODE- 250: Performed by: OBSTETRICS & GYNECOLOGY

## 2018-11-20 PROCEDURE — 74011250637 HC RX REV CODE- 250/637: Performed by: OBSTETRICS & GYNECOLOGY

## 2018-11-20 RX ORDER — HYDROCODONE BITARTRATE AND ACETAMINOPHEN 7.5; 325 MG/1; MG/1
1 TABLET ORAL
Qty: 28 TAB | Refills: 0 | Status: SHIPPED | OUTPATIENT
Start: 2018-11-20 | End: 2018-12-04

## 2018-11-20 RX ORDER — LABETALOL 200 MG/1
200 TABLET, FILM COATED ORAL EVERY 8 HOURS
Qty: 60 TAB | Refills: 1 | Status: SHIPPED | OUTPATIENT
Start: 2018-11-20 | End: 2018-12-12

## 2018-11-20 RX ORDER — IBUPROFEN 800 MG/1
800 TABLET ORAL
Qty: 40 TAB | Refills: 0 | Status: SHIPPED | OUTPATIENT
Start: 2018-11-20 | End: 2019-01-08

## 2018-11-20 RX ADMIN — FERROUS SULFATE TAB 325 MG (65 MG ELEMENTAL FE) 325 MG: 325 (65 FE) TAB at 11:11

## 2018-11-20 RX ADMIN — IBUPROFEN 800 MG: 800 TABLET ORAL at 12:52

## 2018-11-20 RX ADMIN — HYDROCODONE BITARTRATE AND ACETAMINOPHEN 2 TABLET: 7.5; 325 TABLET ORAL at 06:29

## 2018-11-20 RX ADMIN — IBUPROFEN 800 MG: 800 TABLET ORAL at 06:29

## 2018-11-20 RX ADMIN — HYDROCODONE BITARTRATE AND ACETAMINOPHEN 2 TABLET: 7.5; 325 TABLET ORAL at 02:52

## 2018-11-20 RX ADMIN — LABETALOL HCL 200 MG: 200 TABLET, FILM COATED ORAL at 11:10

## 2018-11-20 RX ADMIN — DOCUSATE SODIUM 100 MG: 100 CAPSULE, LIQUID FILLED ORAL at 11:10

## 2018-11-20 RX ADMIN — POLYETHYLENE GLYCOL (3350) 17 G: 17 POWDER, FOR SOLUTION ORAL at 00:55

## 2018-11-20 RX ADMIN — LABETALOL HCL 200 MG: 200 TABLET, FILM COATED ORAL at 00:38

## 2018-11-20 RX ADMIN — SIMETHICONE CHEW TAB 80 MG 80 MG: 80 TABLET ORAL at 11:10

## 2018-11-20 RX ADMIN — LEVOTHYROXINE SODIUM 75 MCG: 50 TABLET ORAL at 07:57

## 2018-11-20 RX ADMIN — HYDROCODONE BITARTRATE AND ACETAMINOPHEN 2 TABLET: 7.5; 325 TABLET ORAL at 11:10

## 2018-11-20 NOTE — DISCHARGE SUMMARY
Obstetrical Discharge Summary     Name: Cem Corey MRN: [id number]  SSN: [id number]    YOB: 1985  Age: 34 y.o. Sex: female      Allergies: Latex; Amoxicillin; Ceftin [cefuroxime axetil]; Oxycodone; Percocet [oxycodone-acetaminophen]; and Vicodin [hydrocodone-acetaminophen]    Admit Date: 2018    Discharge Date: 2018     Admitting Physician: Flora Us MD     Attending Physician:  Ivette Marquez MD     * Admission Diagnoses: PIH;Hypertension affecting pregnancy in third trimester; Hypertension affecting pregnancy in third trimester    * Discharge Diagnoses:   Information for the patient's :  Radha Lew [[id number]]   Delivery of a 7 lb 3.9 oz (3.285 kg) male infant via , Low Transverse on 2018 at 7:56 AM  by . Apgars were 8 and 8. Additional Diagnoses:   Hospital Problems as of 2018 Date Reviewed: 2018          Codes Class Noted - Resolved POA    Hypertension affecting pregnancy in third trimester ICD-10-CM: O16.3  ICD-9-CM: 642.93  2018 - Present Unknown        * (Principal) Chronic hypertension with superimposed preeclampsia ICD-10-CM: O11.9  ICD-9-CM: 642.70  2018 - Present Yes    Overview Addendum 2018  8:20 PM by Lytle Fabry, MD     2018 [facility] Inpatient Consult-New onset hypertensition but has had elevated BPs early in pregnancy. I think has component of underlying chronic hypertension so safe to treat severe BPs with Labetalol as all other labs normal and appropriate fetal growth yesterday, normal YINKA, BPP-8/8. Meets criteria for preeclampsia by 24 hour urine, would diagnose her a superimposed. Explained to her expectant management until severe range BPs or Severe HA or worsening labs. Explained recommendations to patient and  at length. They understand and agree. I also recommended no further Tramadol as could mask symptoms.   · Expectant management until 37 weeks, then delivery by induction or primary C/S.  · Deliver for 2 severe range BPs an hour apart or Severe HA, fetal non-reassurance or HELLP syndrome. · Repeat labs in am.  · Get type and Cross, is at risk of PP Hemorrhage. · Chronic pain, refuses to stop taking Tramadol, will get Neonatologist consultation to observe  for withdrawal.                  Lab Results   Component Value Date/Time    ABO/Rh(D) A POSITIVE 2018 10:19 PM    Rubella, External immune 2018    GrBStrep, External negative 2018 05:25 PM    ABO,Rh A positive 2018    There is no immunization history for the selected administration types on file for this patient. * Procedures:    Procedure(s):   SECTION           * Discharge Condition: good    * Hospital Course: Normal hospital course following the delivery. * Disposition: Home    Discharge Medications:   Current Discharge Medication List      START taking these medications    Details   HYDROcodone-acetaminophen (NORCO) 7.5-325 mg per tablet Take 1 Tab by mouth every four (4) hours as needed. Max Daily Amount: 6 Tabs. Qty: 28 Tab, Refills: 0    Associated Diagnoses: Uterine fibroids affecting pregnancy, third trimester      ibuprofen (MOTRIN) 800 mg tablet Take 1 Tab by mouth every six (6) hours as needed. Qty: 40 Tab, Refills: 0      labetalol (NORMODYNE) 200 mg tablet Take 1 Tab by mouth every eight (8) hours. Qty: 60 Tab, Refills: 1         CONTINUE these medications which have NOT CHANGED    Details   levothyroxine (SYNTHROID) 75 mcg tablet Take 1 Tab by mouth Daily (before breakfast). Qty: 30 Tab, Refills: 4      aspirin delayed-release 81 mg tablet Take  by mouth daily. promethazine (PHENERGAN) 25 mg tablet Take 1 Tab by mouth every six (6) hours as needed for Nausea. Qty: 30 Tab, Refills: 5      raNITIdine (ZANTAC) 150 mg tablet Take 150 mg by mouth two (2) times a day. PRENATAL 47/IRON/FOLATE 1/DHA (PNV-DHA PO) Take  by mouth. cyclobenzaprine (FLEXERIL) 5 mg tablet Take 1 Tab by mouth three (3) times daily as needed for Muscle Spasm(s). Indications: Muscle Spasm  Qty: 40 Tab, Refills: 0         STOP taking these medications       traMADol (ULTRAM) 50 mg tablet Comments:   Reason for Stopping:         cholecalciferol, vitamin D3, (VITAMIN D3) 2,000 unit tab Comments:   Reason for Stopping:         acetaminophen-codeine (TYLENOL-CODEINE #3) 300-30 mg per tablet Comments:   Reason for Stopping:         acetaminophen (TYLENOL) 325 mg tablet Comments:   Reason for Stopping:               * Follow-up Care/Patient Instructions:   Activity: No sex for 6 weeks, No driving while on analgesics and No heavy lifting for 4 weeks  Diet: Regular Diet  Wound Care: Keep wound clean and dry    Follow-up Information     Follow up With Specialties Details Why Contact Info    Unknown, Provider    Patient not available to ask             Signed By:  Alphonso Beckett MD     November 20, 2018

## 2018-11-20 NOTE — PROGRESS NOTES
Shift assessment complete as noted. Patient Up ad chico. Questions encouraged and answered. Encouraged to call for needs or concerns. Verbalizes understanding.

## 2018-11-20 NOTE — PROGRESS NOTES
11/19/18 1986 Pain Assessment Pain Scale 1 Numeric (0 - 10) Pain Intensity 1 9 Pain Location 1 Abdomen; Incisional  
Pain Orientation 1 Lower Pain Description 1 Sore Pain Intervention(s) 1 Medication (see MAR) Motrin 800 mg and Norco 7.5-325mg 2 tabs given for pain per pts request.

## 2018-11-20 NOTE — PROGRESS NOTES
Patient discharged from Mother Infant room. Discharge teaching complete. Patient verbalizes understanding. Questions encouraged and answered. Patient went to Special Care Nursery to visit infant. Stable at discharge. Spouse is with patient.

## 2018-11-20 NOTE — PROGRESS NOTES
11/20/18 7643 Pain Assessment Pain Scale 1 Numeric (0 - 10) Pain Intensity 1 7 Pain Location 1 Abdomen; Incisional  
Pain Orientation 1 Lower Pain Description 1 Sore Pain Intervention(s) 1 Medication (see MAR) Motrin 800 mg and Norco 7.5-325mg 2 tabsgiven for pain per pts request.

## 2018-11-20 NOTE — PROGRESS NOTES
Pt given scheduled Colace PO, Mylicon chewable, Iron PO and Labetalol PO. [place] 2 tab PO given to pt per request.  Educated pt to call out if pain medication does not help.

## 2018-11-20 NOTE — PROGRESS NOTES
11/20/18 0252 Pain 1 Pain Scale 1 Numeric (0 - 10) Pain Intensity 1 9 Pain Location 1 Abdomen; Incisional  
Pain Orientation 1 Lower Pain Description 1 Sore Pain Intervention(s) 1 Medication (see MAR) Norco 7.5-325mg 2 tabs given for pain per pts request.

## 2018-11-20 NOTE — PROGRESS NOTES
met with patient and /FOB. Family expressed concern about paying for hospital bill due to baby's admission to the NICU. Per patient, baby will not be added to her policy, but will be added to her 's policy. Copy of 's insurance card obtained and provided to financial counselor. Baby's name is Clearence Jeanna. Family states that they are currently \"surviving. \"  Emotional support offered regarding patient's lengthy hospitalization and baby's admission to the NICU.  provided education and pamphlet on [facility].  Family was undecided on need for home visit. No referral will be made at this time. Family has this 's contact information should they decide to participate in program.   
 
 provided informational packet on  mood disorder education/resources. Family receptive to receiving information and denied any additional needs from . Family has this 's contact information should any needs/questions arise. Terry [address]

## 2018-11-20 NOTE — PROGRESS NOTES
Pt given scheduled Synthriod 75 mcg. Pt declines assessment and other meds at this time. Pt states she will call RN when she returns to her room from the SCN. Her spouse is with her. Strongly encouraged pt to walk so she can begin to pass gas.

## 2018-11-20 NOTE — PROGRESS NOTES
Post-Operative Day Number 3 Progress/Discharge Note Patient doing well post-op day 3 from  delivery without significant complaints. Pain controlled on current medication. Voiding without difficulty, normal lochia. Vitals:   
Patient Vitals for the past 8 hrs: 
 BP Temp Pulse Resp SpO2  
18 1053 (!) 175/91 98.4 °F (36.9 °C) 87 20 100 % 18 0659 116/87 98.2 °F (36.8 °C) 93 20 100 % Temp (24hrs), Av °F (36.7 °C), Min:97.4 °F (36.3 °C), Max:98.4 °F (36.9 °C) Vital signs stable, afebrile. Exam:  Patient without distress. Abdomen soft, fundus firm at level of umbilicus, non tender. Incision dry and clean without erythema. Lower extremities are negative for swelling, cords or tenderness. Lab/Data Review: All lab results for the last 24 hours reviewed. Assessment and Plan:  Patient appears to be having uncomplicated post- course. Continue routine post-op care and maternal education. Plan discharge for today with follow up in our office in 1-2 weeks.

## 2018-12-04 PROBLEM — E66.01 OBESITY, MORBID (HCC): Status: ACTIVE | Noted: 2018-12-04

## 2019-06-28 NOTE — PROGRESS NOTES
08:35 Medication Given labetalol (NORMODYNE) tablet 100 mg -  Dose: 100 mg ; Route: Oral ; Scheduled Time: 0800  Antoni Turner RN  
 
 
 132

## 2019-07-23 PROBLEM — E03.9 HYPOTHYROIDISM COMPLICATING PREGNANCY, THIRD TRIMESTER: Status: RESOLVED | Noted: 2018-04-24 | Resolved: 2019-07-23

## 2019-07-23 PROBLEM — O16.3 HYPERTENSION AFFECTING PREGNANCY IN THIRD TRIMESTER: Status: RESOLVED | Noted: 2018-11-14 | Resolved: 2019-07-23

## 2019-07-23 PROBLEM — M54.9 BACK PAIN COMPLICATING PREGNANCY, THIRD TRIMESTER: Status: RESOLVED | Noted: 2018-04-11 | Resolved: 2019-07-23

## 2019-07-23 PROBLEM — E28.2 POLYCYSTIC OVARY AFFECTING PREGNANCY, ANTEPARTUM: Status: RESOLVED | Noted: 2018-04-24 | Resolved: 2019-07-23

## 2019-07-23 PROBLEM — O34.80 POLYCYSTIC OVARY AFFECTING PREGNANCY, ANTEPARTUM: Status: RESOLVED | Noted: 2018-04-24 | Resolved: 2019-07-23

## 2019-07-23 PROBLEM — O11.9 CHRONIC HYPERTENSION WITH SUPERIMPOSED PREECLAMPSIA: Status: RESOLVED | Noted: 2018-11-05 | Resolved: 2019-07-23

## 2019-07-23 PROBLEM — O99.891 BACK PAIN COMPLICATING PREGNANCY, THIRD TRIMESTER: Status: RESOLVED | Noted: 2018-04-11 | Resolved: 2019-07-23

## 2019-07-23 PROBLEM — O99.283 HYPOTHYROIDISM COMPLICATING PREGNANCY, THIRD TRIMESTER: Status: RESOLVED | Noted: 2018-04-24 | Resolved: 2019-07-23

## 2019-07-23 PROBLEM — O35.2XX0 HEREDITARY DISEASE IN FAMILY POSSIBLY AFFECTING FETUS: Status: RESOLVED | Noted: 2018-05-25 | Resolved: 2019-07-23

## 2019-07-23 PROBLEM — O34.13 UTERINE FIBROIDS AFFECTING PREGNANCY, THIRD TRIMESTER: Status: RESOLVED | Noted: 2018-04-24 | Resolved: 2019-07-23

## 2019-07-23 PROBLEM — D25.9 UTERINE FIBROIDS AFFECTING PREGNANCY, THIRD TRIMESTER: Status: RESOLVED | Noted: 2018-04-24 | Resolved: 2019-07-23

## 2019-07-23 PROBLEM — O40.3XX0 POLYHYDRAMNIOS AFFECTING PREGNANCY IN THIRD TRIMESTER: Status: RESOLVED | Noted: 2018-10-03 | Resolved: 2019-07-23

## 2019-07-23 PROBLEM — O09.93 HIGH-RISK PREGNANCY IN THIRD TRIMESTER: Status: RESOLVED | Noted: 2018-04-24 | Resolved: 2019-07-23

## 2019-10-09 PROBLEM — Z71.89 MEDICATION CARE PLAN DISCUSSED WITH PATIENT: Status: ACTIVE | Noted: 2019-10-09

## 2019-10-21 PROBLEM — O20.0 THREATENED ABORTION: Status: ACTIVE | Noted: 2019-10-21

## 2020-11-25 ENCOUNTER — HOSPITAL ENCOUNTER (EMERGENCY)
Age: 35
Discharge: HOME | End: 2020-11-25
Attending: EMERGENCY MEDICINE
Payer: COMMERCIAL

## 2020-11-25 ENCOUNTER — APPOINTMENT (OUTPATIENT)
Dept: ULTRASOUND IMAGING | Age: 35
End: 2020-11-25
Attending: EMERGENCY MEDICINE
Payer: COMMERCIAL

## 2020-11-25 VITALS
OXYGEN SATURATION: 96 % | BODY MASS INDEX: 48.15 KG/M2 | SYSTOLIC BLOOD PRESSURE: 156 MMHG | TEMPERATURE: 98 F | DIASTOLIC BLOOD PRESSURE: 88 MMHG | RESPIRATION RATE: 16 BRPM | WEIGHT: 255 LBS | HEIGHT: 61 IN | HEART RATE: 95 BPM

## 2020-11-25 DIAGNOSIS — O03.9 MISCARRIAGE: Primary | ICD-10-CM

## 2020-11-25 LAB
ALBUMIN SERPL-MCNC: 3 G/DL (ref 3.5–5)
ALBUMIN/GLOB SERPL: 0.6 {RATIO} (ref 1.2–3.5)
ALP SERPL-CCNC: 68 U/L (ref 50–136)
ALT SERPL-CCNC: 19 U/L (ref 12–65)
ANION GAP SERPL CALC-SCNC: 11 MMOL/L (ref 7–16)
AST SERPL-CCNC: 55 U/L (ref 15–37)
BASOPHILS # BLD: 0.1 K/UL (ref 0–0.2)
BASOPHILS NFR BLD: 1 % (ref 0–2)
BILIRUB SERPL-MCNC: 0.4 MG/DL (ref 0.2–1.1)
BUN SERPL-MCNC: 10 MG/DL (ref 6–23)
CALCIUM SERPL-MCNC: 8.5 MG/DL (ref 8.3–10.4)
CHLORIDE SERPL-SCNC: 105 MMOL/L (ref 98–107)
CO2 SERPL-SCNC: 21 MMOL/L (ref 21–32)
CREAT SERPL-MCNC: 0.69 MG/DL (ref 0.6–1)
DIFFERENTIAL METHOD BLD: ABNORMAL
EOSINOPHIL # BLD: 0.4 K/UL (ref 0–0.8)
EOSINOPHIL NFR BLD: 3 % (ref 0.5–7.8)
ERYTHROCYTE [DISTWIDTH] IN BLOOD BY AUTOMATED COUNT: 16.4 % (ref 11.9–14.6)
GLOBULIN SER CALC-MCNC: 5 G/DL (ref 2.3–3.5)
GLUCOSE SERPL-MCNC: 84 MG/DL (ref 65–100)
HCG SERPL-ACNC: 278 MIU/ML (ref 0–6)
HCT VFR BLD AUTO: 36.9 % (ref 35.8–46.3)
HGB BLD-MCNC: 11.3 G/DL (ref 11.7–15.4)
IMM GRANULOCYTES # BLD AUTO: 0.1 K/UL (ref 0–0.5)
IMM GRANULOCYTES NFR BLD AUTO: 0 % (ref 0–5)
LYMPHOCYTES # BLD: 4 K/UL (ref 0.5–4.6)
LYMPHOCYTES NFR BLD: 25 % (ref 13–44)
MCH RBC QN AUTO: 23.8 PG (ref 26.1–32.9)
MCHC RBC AUTO-ENTMCNC: 30.6 G/DL (ref 31.4–35)
MCV RBC AUTO: 77.8 FL (ref 79.6–97.8)
MONOCYTES # BLD: 0.8 K/UL (ref 0.1–1.3)
MONOCYTES NFR BLD: 5 % (ref 4–12)
NEUTS SEG # BLD: 10.3 K/UL (ref 1.7–8.2)
NEUTS SEG NFR BLD: 66 % (ref 43–78)
NRBC # BLD: 0 K/UL (ref 0–0.2)
PLATELET # BLD AUTO: 426 K/UL (ref 150–450)
PMV BLD AUTO: 9.8 FL (ref 9.4–12.3)
POTASSIUM SERPL-SCNC: 4.7 MMOL/L (ref 3.5–5.1)
PROT SERPL-MCNC: 8 G/DL (ref 6.3–8.2)
RBC # BLD AUTO: 4.74 M/UL (ref 4.05–5.2)
SODIUM SERPL-SCNC: 137 MMOL/L (ref 136–145)
WBC # BLD AUTO: 15.6 K/UL (ref 4.3–11.1)

## 2020-11-25 PROCEDURE — 99283 EMERGENCY DEPT VISIT LOW MDM: CPT

## 2020-11-25 PROCEDURE — 80053 COMPREHEN METABOLIC PANEL: CPT

## 2020-11-25 PROCEDURE — 76817 TRANSVAGINAL US OBSTETRIC: CPT

## 2020-11-25 PROCEDURE — 85025 COMPLETE CBC W/AUTO DIFF WBC: CPT

## 2020-11-25 PROCEDURE — 74011250636 HC RX REV CODE- 250/636: Performed by: EMERGENCY MEDICINE

## 2020-11-25 PROCEDURE — 84702 CHORIONIC GONADOTROPIN TEST: CPT

## 2020-11-25 RX ADMIN — SODIUM CHLORIDE 1000 ML: 900 INJECTION, SOLUTION INTRAVENOUS at 14:40

## 2020-11-25 NOTE — ED TRIAGE NOTES
Pt reports LMP was 10/13/2020. Pt states she is having bleeding, left sided cramping and low back pain. Pt was told to come to ED to rule out ectopic. Pt wearing mask in triage.

## 2020-11-25 NOTE — ED PROVIDER NOTES
Patient is a 81-year-old female who was directed to the emergency department today by her OB/GYN office for further evaluation and an ultrasound. Patient is currently pregnant and the OB office has been following serial quantitative hCG levels and concerned about miscarriage. She has had 3 prior miscarriages in the past.  Patient states that her hCG was 400 on last check 2 days ago. However 2 days ago she started having increased pain in the left lower abdomen and pelvis and some light vaginal bleeding. She denies any clots or fluid leakage. The pain is continued to worsen. She was supposed to see her OB in the office again this afternoon but when pain was more severe today they directed her here for recheck. The history is provided by the patient. Threatened Miscarriage   Primary symptoms include pelvic pain, vaginal bleeding. Primary symptoms include no discharge, no genital lesions, no genital pain, no genital rash, no dysuria. There has been no fever. This is a new problem. The current episode started more than 2 days ago. The problem occurs constantly. The problem has been gradually worsening. Her LMP was weeks ago. Associated symptoms include abdominal pain. Pertinent negatives include no diarrhea, no nausea, no vomiting, no frequency, no flank pain and no fever. no prior STDAssociated medical issues include miscarriage.         Past Medical History:   Diagnosis Date    Arthritis     joint pain to lumber nerve damage lft side    Bowel trouble     Calculus of kidney     possible kidney stones    Chronic pain     Endometriosis     Fibroids     Fractures     arms and hands    Gestational hypertension     Headache     Hypertension affecting pregnancy in third trimester 11/5/2018    Hypothyroid     Infertility, female     PCOS (polycystic ovarian syndrome)     Pneumonia     Polycystic disease, ovaries        Past Surgical History:   Procedure Laterality Date    HX BREAST BIOPSY      HX  SECTION      x1    HX COLONOSCOPY      HX HEENT      tonsils    HX LUMBAR DISKECTOMY      L5-S1    HX TONSILLECTOMY      HX WISDOM TEETH EXTRACTION           Family History:   Problem Relation Age of Onset    Hypertension Father     Heart Disease Father     Breast Cancer Maternal Aunt     Breast Cancer Paternal Aunt     Diabetes Maternal Grandmother     Stroke Maternal Grandmother     Stroke Paternal [de-identified]     Colon Cancer Paternal Grandmother     Breast Cancer Maternal Aunt     Kidney Disease Mother     Cancer Mother         Skin    Thyroid Disease Sister         hypothryoid    Other Sister         MTHFR gene    Suicide Brother         gun shot    Breast Cancer Other        Social History     Socioeconomic History    Marital status:      Spouse name: Not on file    Number of children: Not on file    Years of education: Not on file    Highest education level: Not on file   Occupational History    Not on file   Social Needs    Financial resource strain: Not on file    Food insecurity     Worry: Not on file     Inability: Not on file    Transportation needs     Medical: Not on file     Non-medical: Not on file   Tobacco Use    Smoking status: Never Smoker    Smokeless tobacco: Never Used   Substance and Sexual Activity    Alcohol use: No    Drug use: No    Sexual activity: Yes     Partners: Male     Birth control/protection: None   Lifestyle    Physical activity     Days per week: Not on file     Minutes per session: Not on file    Stress: Not on file   Relationships    Social connections     Talks on phone: Not on file     Gets together: Not on file     Attends Anglican service: Not on file     Active member of club or organization: Not on file     Attends meetings of clubs or organizations: Not on file     Relationship status: Not on file    Intimate partner violence     Fear of current or ex partner: Not on file     Emotionally abused: Not on file Physically abused: Not on file     Forced sexual activity: Not on file   Other Topics Concern     Service Not Asked    Blood Transfusions Not Asked    Caffeine Concern Not Asked    Occupational Exposure Not Asked    Hobby Hazards Not Asked    Sleep Concern Not Asked    Stress Concern Not Asked    Weight Concern Not Asked    Special Diet Not Asked    Back Care Not Asked    Exercise Not Asked    Bike Helmet Not Asked   [address] Not Asked    Self-Exams Not Asked   Social History Narrative    Not on file         ALLERGIES: Latex; Amoxicillin; Ceftin [cefuroxime axetil]; Oxycodone; Percocet [oxycodone-acetaminophen]; and Vicodin [hydrocodone-acetaminophen]    Review of Systems   Constitutional: Negative for chills, fatigue and fever. HENT: Negative for congestion, rhinorrhea and sore throat. Eyes: Negative for pain, discharge and visual disturbance. Respiratory: Negative for cough and shortness of breath. Cardiovascular: Negative for chest pain and palpitations. Gastrointestinal: Positive for abdominal pain. Negative for diarrhea, nausea and vomiting. Endocrine: Negative for polydipsia and polyuria. Genitourinary: Positive for pelvic pain and vaginal bleeding. Negative for dysuria, flank pain, frequency and urgency. Musculoskeletal: Negative for back pain and neck pain. Skin: Negative for rash. Neurological: Negative for seizures, syncope and weakness. Hematological: Negative. Vitals:    11/25/20 1253   BP: (!) 162/95   Pulse: (!) 110   Resp: 16   Temp: 98 °F (36.7 °C)   SpO2: 96%   Weight: 115.7 kg (255 lb)   Height: 5' 1\" (1.549 m)            Physical Exam  Vitals signs and nursing note reviewed. Constitutional:       Appearance: She is well-developed. She is ill-appearing. HENT:      Head: Normocephalic and atraumatic. Eyes:      Conjunctiva/sclera: Conjunctivae normal.      Pupils: Pupils are equal, round, and reactive to light.    Neck: Musculoskeletal: Normal range of motion and neck supple. Cardiovascular:      Rate and Rhythm: Regular rhythm. Tachycardia present. Pulmonary:      Effort: Pulmonary effort is normal.      Breath sounds: Normal breath sounds. Abdominal:      Palpations: Abdomen is soft. Tenderness: There is abdominal tenderness. There is guarding. There is no rebound. Comments: Tender in the left lower abdomen and left pelvis. Musculoskeletal: Normal range of motion. General: No deformity. Lymphadenopathy:      Cervical: No cervical adenopathy. Skin:     General: Skin is warm and dry. Capillary Refill: Capillary refill takes less than 2 seconds. Findings: No rash. Neurological:      General: No focal deficit present. Mental Status: She is alert and oriented to person, place, and time. GCS: GCS eye subscore is 4. GCS verbal subscore is 5. GCS motor subscore is 6. Cranial Nerves: No cranial nerve deficit. Sensory: No sensory deficit. Motor: No weakness. Psychiatric:         Mood and Affect: Mood is anxious. MDM  Number of Diagnoses or Management Options  Diagnosis management comments: I wore appropriate PPE throughout this patient's ED visit. Dhiraj Chamorro MD, 1:57 PM      3:42 PM  Hemoglobin stable  Chemistries unremarkable  Urine negative for infection  Serum hCG decreased to 270    Pelvic ultrasound shows no identifiable pregnancy. Possible left corpus luteum cyst.  No free fluid. No sign of ectopic. Small fibroids as well in the uterus    Clinically I think this is likely another miscarriage as patient has had several.  Advised to follow-up with her OB/GYN for recheck.          Amount and/or Complexity of Data Reviewed  Clinical lab tests: ordered and reviewed  Tests in the radiology section of CPT®: ordered and reviewed  Review and summarize past medical records: yes  Independent visualization of images, tracings, or specimens: yes    Risk of Complications, Morbidity, and/or Mortality  Presenting problems: moderate  Diagnostic procedures: moderate    Patient Progress  Patient progress: stable         Procedures

## 2020-11-25 NOTE — ED NOTES
I have reviewed discharge instructions with the patient. The patient verbalized understanding. Patient left ED via Discharge Method: ambulatory to Home with self. Opportunity for questions and clarification provided. Patient given 0 scripts. To continue your aftercare when you leave the hospital, you may receive an automated call from our care team to check in on how you are doing. This is a free service and part of our promise to provide the best care and service to meet your aftercare needs.  If you have questions, or wish to unsubscribe from this service please call [phone]. Thank you for Choosing our [facility] Emergency Department.

## 2020-12-09 ENCOUNTER — HOSPITAL ENCOUNTER (EMERGENCY)
Age: 35
Discharge: HOME | End: 2020-12-10
Attending: OBSTETRICS & GYNECOLOGY | Admitting: OBSTETRICS & GYNECOLOGY
Payer: COMMERCIAL

## 2020-12-09 ENCOUNTER — ANESTHESIA (OUTPATIENT)
Dept: SURGERY | Age: 35
End: 2020-12-09
Payer: COMMERCIAL

## 2020-12-09 ENCOUNTER — ANESTHESIA EVENT (OUTPATIENT)
Dept: SURGERY | Age: 35
End: 2020-12-09
Payer: COMMERCIAL

## 2020-12-09 VITALS
TEMPERATURE: 97.5 F | DIASTOLIC BLOOD PRESSURE: 56 MMHG | HEART RATE: 83 BPM | SYSTOLIC BLOOD PRESSURE: 118 MMHG | RESPIRATION RATE: 18 BRPM | WEIGHT: 260 LBS | BODY MASS INDEX: 49.09 KG/M2 | HEIGHT: 61 IN | OXYGEN SATURATION: 94 %

## 2020-12-09 DIAGNOSIS — O00.102 LEFT TUBAL PREGNANCY WITHOUT INTRAUTERINE PREGNANCY: Primary | ICD-10-CM

## 2020-12-09 PROBLEM — O00.90 ECTOPIC PREGNANCY WITHOUT INTRAUTERINE PREGNANCY: Status: ACTIVE | Noted: 2020-12-09

## 2020-12-09 LAB
ABO + RH BLD: NORMAL
ALBUMIN SERPL-MCNC: 3.1 G/DL (ref 3.5–5)
ALBUMIN/GLOB SERPL: 0.6 {RATIO} (ref 1.2–3.5)
ALP SERPL-CCNC: 69 U/L (ref 50–136)
ALT SERPL-CCNC: 16 U/L (ref 12–65)
ANION GAP SERPL CALC-SCNC: 9 MMOL/L (ref 7–16)
AST SERPL-CCNC: 16 U/L (ref 15–37)
BASOPHILS # BLD: 0.1 K/UL (ref 0–0.2)
BASOPHILS NFR BLD: 0 % (ref 0–2)
BILIRUB SERPL-MCNC: 0.2 MG/DL (ref 0.2–1.1)
BLOOD GROUP ANTIBODIES SERPL: NORMAL
BUN SERPL-MCNC: 11 MG/DL (ref 6–23)
CALCIUM SERPL-MCNC: 8.9 MG/DL (ref 8.3–10.4)
CHLORIDE SERPL-SCNC: 106 MMOL/L (ref 98–107)
CO2 SERPL-SCNC: 23 MMOL/L (ref 21–32)
CREAT SERPL-MCNC: 0.8 MG/DL (ref 0.6–1)
DIFFERENTIAL METHOD BLD: ABNORMAL
EOSINOPHIL # BLD: 0.4 K/UL (ref 0–0.8)
EOSINOPHIL NFR BLD: 3 % (ref 0.5–7.8)
ERYTHROCYTE [DISTWIDTH] IN BLOOD BY AUTOMATED COUNT: 16.5 % (ref 11.9–14.6)
GLOBULIN SER CALC-MCNC: 4.8 G/DL (ref 2.3–3.5)
GLUCOSE SERPL-MCNC: 88 MG/DL (ref 65–100)
HCT VFR BLD AUTO: 36 % (ref 35.8–46.3)
HGB BLD-MCNC: 10.9 G/DL (ref 11.7–15.4)
IMM GRANULOCYTES # BLD AUTO: 0.1 K/UL (ref 0–0.5)
IMM GRANULOCYTES NFR BLD AUTO: 0 % (ref 0–5)
LYMPHOCYTES # BLD: 4.6 K/UL (ref 0.5–4.6)
LYMPHOCYTES NFR BLD: 29 % (ref 13–44)
MCH RBC QN AUTO: 23.2 PG (ref 26.1–32.9)
MCHC RBC AUTO-ENTMCNC: 30.3 G/DL (ref 31.4–35)
MCV RBC AUTO: 76.8 FL (ref 79.6–97.8)
MONOCYTES # BLD: 0.9 K/UL (ref 0.1–1.3)
MONOCYTES NFR BLD: 5 % (ref 4–12)
NEUTS SEG # BLD: 10.1 K/UL (ref 1.7–8.2)
NEUTS SEG NFR BLD: 63 % (ref 43–78)
NRBC # BLD: 0 K/UL (ref 0–0.2)
PLATELET # BLD AUTO: 378 K/UL (ref 150–450)
PMV BLD AUTO: 9.9 FL (ref 9.4–12.3)
POTASSIUM SERPL-SCNC: 4 MMOL/L (ref 3.5–5.1)
PROT SERPL-MCNC: 7.9 G/DL (ref 6.3–8.2)
RBC # BLD AUTO: 4.69 M/UL (ref 4.05–5.2)
SODIUM SERPL-SCNC: 138 MMOL/L (ref 136–145)
SPECIMEN EXP DATE BLD: NORMAL
WBC # BLD AUTO: 16.1 K/UL (ref 4.3–11.1)

## 2020-12-09 PROCEDURE — 77030007955 HC PCH ENDOSC SPEC J&J -B: Performed by: OBSTETRICS & GYNECOLOGY

## 2020-12-09 PROCEDURE — 74011000250 HC RX REV CODE- 250: Performed by: NURSE ANESTHETIST, CERTIFIED REGISTERED

## 2020-12-09 PROCEDURE — 80053 COMPREHEN METABOLIC PANEL: CPT

## 2020-12-09 PROCEDURE — 74011250636 HC RX REV CODE- 250/636: Performed by: NURSE ANESTHETIST, CERTIFIED REGISTERED

## 2020-12-09 PROCEDURE — 77030031139 HC SUT VCRL2 J&J -A: Performed by: OBSTETRICS & GYNECOLOGY

## 2020-12-09 PROCEDURE — 77030037088 HC TUBE ENDOTRACH ORAL NSL COVD-A: Performed by: ANESTHESIOLOGY

## 2020-12-09 PROCEDURE — 77030008522 HC TBNG INSUF LAPRO STRY -B: Performed by: OBSTETRICS & GYNECOLOGY

## 2020-12-09 PROCEDURE — 77030040361 HC SLV COMPR DVT MDII -B: Performed by: OBSTETRICS & GYNECOLOGY

## 2020-12-09 PROCEDURE — 77030008608 HC TRCR ENDOSC SMTH AMR -B: Performed by: OBSTETRICS & GYNECOLOGY

## 2020-12-09 PROCEDURE — 77030011502 HC MANIP UTER ZUM ZINN -B: Performed by: OBSTETRICS & GYNECOLOGY

## 2020-12-09 PROCEDURE — 77030008606 HC TRCR ENDOSC KII AMR -B: Performed by: OBSTETRICS & GYNECOLOGY

## 2020-12-09 PROCEDURE — 77030028750 HC FCPS ENDOSC BPLR HALO OCOA -E: Performed by: OBSTETRICS & GYNECOLOGY

## 2020-12-09 PROCEDURE — 77030020829: Performed by: OBSTETRICS & GYNECOLOGY

## 2020-12-09 PROCEDURE — 2709999900 HC NON-CHARGEABLE SUPPLY: Performed by: OBSTETRICS & GYNECOLOGY

## 2020-12-09 PROCEDURE — 77030018352 HC SHR COAG HARM2 J&J -E: Performed by: OBSTETRICS & GYNECOLOGY

## 2020-12-09 PROCEDURE — 77030040830 HC CATH URETH FOL MDII -A: Performed by: OBSTETRICS & GYNECOLOGY

## 2020-12-09 PROCEDURE — 74011000250 HC RX REV CODE- 250: Performed by: OBSTETRICS & GYNECOLOGY

## 2020-12-09 PROCEDURE — 77030034849: Performed by: OBSTETRICS & GYNECOLOGY

## 2020-12-09 PROCEDURE — 74011250636 HC RX REV CODE- 250/636: Performed by: ANESTHESIOLOGY

## 2020-12-09 PROCEDURE — 76010000161 HC OR TIME 1 TO 1.5 HR INTENSV-TIER 1: Performed by: OBSTETRICS & GYNECOLOGY

## 2020-12-09 PROCEDURE — 77030039425 HC BLD LARYNG TRULITE DISP TELE -A: Performed by: ANESTHESIOLOGY

## 2020-12-09 PROCEDURE — 86900 BLOOD TYPING SEROLOGIC ABO: CPT

## 2020-12-09 PROCEDURE — 77030010507 HC ADH SKN DERMBND J&J -B: Performed by: OBSTETRICS & GYNECOLOGY

## 2020-12-09 PROCEDURE — 85025 COMPLETE CBC W/AUTO DIFF WBC: CPT

## 2020-12-09 PROCEDURE — 75810000275 HC EMERGENCY DEPT VISIT NO LEVEL OF CARE

## 2020-12-09 PROCEDURE — 76210000006 HC OR PH I REC 0.5 TO 1 HR: Performed by: OBSTETRICS & GYNECOLOGY

## 2020-12-09 PROCEDURE — 88305 TISSUE EXAM BY PATHOLOGIST: CPT

## 2020-12-09 PROCEDURE — 77030003578 HC NDL INSUF VERES AMR -B: Performed by: OBSTETRICS & GYNECOLOGY

## 2020-12-09 PROCEDURE — 77030008756 HC TU IRR SUC STRY -B: Performed by: OBSTETRICS & GYNECOLOGY

## 2020-12-09 PROCEDURE — 76060000033 HC ANESTHESIA 1 TO 1.5 HR: Performed by: OBSTETRICS & GYNECOLOGY

## 2020-12-09 RX ORDER — SODIUM CHLORIDE, SODIUM LACTATE, POTASSIUM CHLORIDE, CALCIUM CHLORIDE 600; 310; 30; 20 MG/100ML; MG/100ML; MG/100ML; MG/100ML
150 INJECTION, SOLUTION INTRAVENOUS CONTINUOUS
Status: DISCONTINUED | OUTPATIENT
Start: 2020-12-09 | End: 2020-12-10 | Stop reason: HOSPADM

## 2020-12-09 RX ORDER — HYDROMORPHONE HYDROCHLORIDE 2 MG/ML
0.5 INJECTION, SOLUTION INTRAMUSCULAR; INTRAVENOUS; SUBCUTANEOUS
Status: COMPLETED | OUTPATIENT
Start: 2020-12-09 | End: 2020-12-09

## 2020-12-09 RX ORDER — SODIUM CHLORIDE, SODIUM LACTATE, POTASSIUM CHLORIDE, CALCIUM CHLORIDE 600; 310; 30; 20 MG/100ML; MG/100ML; MG/100ML; MG/100ML
75 INJECTION, SOLUTION INTRAVENOUS CONTINUOUS
Status: DISCONTINUED | OUTPATIENT
Start: 2020-12-09 | End: 2020-12-10 | Stop reason: HOSPADM

## 2020-12-09 RX ORDER — FENTANYL CITRATE 50 UG/ML
INJECTION, SOLUTION INTRAMUSCULAR; INTRAVENOUS AS NEEDED
Status: DISCONTINUED | OUTPATIENT
Start: 2020-12-09 | End: 2020-12-09

## 2020-12-09 RX ORDER — BUPIVACAINE HYDROCHLORIDE 5 MG/ML
INJECTION, SOLUTION EPIDURAL; INTRACAUDAL AS NEEDED
Status: DISCONTINUED | OUTPATIENT
Start: 2020-12-09 | End: 2020-12-09 | Stop reason: HOSPADM

## 2020-12-09 RX ORDER — LABETALOL HYDROCHLORIDE 5 MG/ML
INJECTION, SOLUTION INTRAVENOUS AS NEEDED
Status: DISCONTINUED | OUTPATIENT
Start: 2020-12-09 | End: 2020-12-09 | Stop reason: HOSPADM

## 2020-12-09 RX ORDER — ONDANSETRON 2 MG/ML
INJECTION INTRAMUSCULAR; INTRAVENOUS AS NEEDED
Status: DISCONTINUED | OUTPATIENT
Start: 2020-12-09 | End: 2020-12-09 | Stop reason: HOSPADM

## 2020-12-09 RX ORDER — SUCCINYLCHOLINE CHLORIDE 20 MG/ML
INJECTION INTRAMUSCULAR; INTRAVENOUS AS NEEDED
Status: DISCONTINUED | OUTPATIENT
Start: 2020-12-09 | End: 2020-12-09 | Stop reason: HOSPADM

## 2020-12-09 RX ORDER — LIDOCAINE HYDROCHLORIDE 20 MG/ML
INJECTION, SOLUTION EPIDURAL; INFILTRATION; INTRACAUDAL; PERINEURAL AS NEEDED
Status: DISCONTINUED | OUTPATIENT
Start: 2020-12-09 | End: 2020-12-09 | Stop reason: HOSPADM

## 2020-12-09 RX ORDER — PROPOFOL 10 MG/ML
INJECTION, EMULSION INTRAVENOUS AS NEEDED
Status: DISCONTINUED | OUTPATIENT
Start: 2020-12-09 | End: 2020-12-09 | Stop reason: HOSPADM

## 2020-12-09 RX ORDER — DEXAMETHASONE SODIUM PHOSPHATE 4 MG/ML
INJECTION, SOLUTION INTRA-ARTICULAR; INTRALESIONAL; INTRAMUSCULAR; INTRAVENOUS; SOFT TISSUE AS NEEDED
Status: DISCONTINUED | OUTPATIENT
Start: 2020-12-09 | End: 2020-12-09 | Stop reason: HOSPADM

## 2020-12-09 RX ORDER — GLYCOPYRROLATE 0.2 MG/ML
INJECTION INTRAMUSCULAR; INTRAVENOUS AS NEEDED
Status: DISCONTINUED | OUTPATIENT
Start: 2020-12-09 | End: 2020-12-09 | Stop reason: HOSPADM

## 2020-12-09 RX ORDER — NEOSTIGMINE METHYLSULFATE 1 MG/ML
INJECTION, SOLUTION INTRAVENOUS AS NEEDED
Status: DISCONTINUED | OUTPATIENT
Start: 2020-12-09 | End: 2020-12-09 | Stop reason: HOSPADM

## 2020-12-09 RX ORDER — HYDROMORPHONE HYDROCHLORIDE 2 MG/1
2 TABLET ORAL
Qty: 15 TAB | Refills: 0 | Status: SHIPPED | OUTPATIENT
Start: 2020-12-09 | End: 2020-12-12

## 2020-12-09 RX ORDER — NALOXONE HYDROCHLORIDE 0.4 MG/ML
0.1 INJECTION, SOLUTION INTRAMUSCULAR; INTRAVENOUS; SUBCUTANEOUS AS NEEDED
Status: ACTIVE | OUTPATIENT
Start: 2020-12-09 | End: 2020-12-09

## 2020-12-09 RX ORDER — KETOROLAC TROMETHAMINE 30 MG/ML
INJECTION, SOLUTION INTRAMUSCULAR; INTRAVENOUS AS NEEDED
Status: DISCONTINUED | OUTPATIENT
Start: 2020-12-09 | End: 2020-12-09 | Stop reason: HOSPADM

## 2020-12-09 RX ORDER — DIPHENHYDRAMINE HYDROCHLORIDE 50 MG/ML
12.5 INJECTION, SOLUTION INTRAMUSCULAR; INTRAVENOUS ONCE
Status: DISCONTINUED | OUTPATIENT
Start: 2020-12-09 | End: 2020-12-10 | Stop reason: HOSPADM

## 2020-12-09 RX ORDER — ROCURONIUM BROMIDE 10 MG/ML
INJECTION, SOLUTION INTRAVENOUS AS NEEDED
Status: DISCONTINUED | OUTPATIENT
Start: 2020-12-09 | End: 2020-12-09 | Stop reason: HOSPADM

## 2020-12-09 RX ORDER — HYDROMORPHONE HYDROCHLORIDE 2 MG/ML
0.5 INJECTION, SOLUTION INTRAMUSCULAR; INTRAVENOUS; SUBCUTANEOUS
Status: DISCONTINUED | OUTPATIENT
Start: 2020-12-09 | End: 2020-12-10 | Stop reason: HOSPADM

## 2020-12-09 RX ORDER — ONDANSETRON 2 MG/ML
4 INJECTION INTRAMUSCULAR; INTRAVENOUS ONCE
Status: DISCONTINUED | OUTPATIENT
Start: 2020-12-09 | End: 2020-12-10 | Stop reason: HOSPADM

## 2020-12-09 RX ADMIN — CEFAZOLIN 3 G: 1 INJECTION, POWDER, FOR SOLUTION INTRAVENOUS at 21:15

## 2020-12-09 RX ADMIN — GLYCOPYRROLATE 0.4 MG: 0.2 INJECTION, SOLUTION INTRAMUSCULAR; INTRAVENOUS at 21:42

## 2020-12-09 RX ADMIN — HYDROMORPHONE HYDROCHLORIDE 0.5 MG: 2 INJECTION INTRAMUSCULAR; INTRAVENOUS; SUBCUTANEOUS at 22:17

## 2020-12-09 RX ADMIN — HYDROMORPHONE HYDROCHLORIDE 0.5 MG: 2 INJECTION INTRAMUSCULAR; INTRAVENOUS; SUBCUTANEOUS at 22:39

## 2020-12-09 RX ADMIN — ROCURONIUM BROMIDE 10 MG: 10 INJECTION, SOLUTION INTRAVENOUS at 20:57

## 2020-12-09 RX ADMIN — LABETALOL HYDROCHLORIDE 10 MG: 5 INJECTION INTRAVENOUS at 21:08

## 2020-12-09 RX ADMIN — SUCCINYLCHOLINE CHLORIDE 180 MG: 20 INJECTION, SOLUTION INTRAMUSCULAR; INTRAVENOUS at 20:57

## 2020-12-09 RX ADMIN — LIDOCAINE HYDROCHLORIDE 100 MG: 20 INJECTION, SOLUTION EPIDURAL; INFILTRATION; INTRACAUDAL; PERINEURAL at 20:57

## 2020-12-09 RX ADMIN — HYDROMORPHONE HYDROCHLORIDE 0.5 MG: 2 INJECTION INTRAMUSCULAR; INTRAVENOUS; SUBCUTANEOUS at 22:27

## 2020-12-09 RX ADMIN — PHENYLEPHRINE HYDROCHLORIDE 100 MCG: 10 INJECTION INTRAVENOUS at 21:19

## 2020-12-09 RX ADMIN — PROPOFOL 200 MG: 10 INJECTION, EMULSION INTRAVENOUS at 20:57

## 2020-12-09 RX ADMIN — KETOROLAC TROMETHAMINE 30 MG: 30 INJECTION, SOLUTION INTRAMUSCULAR at 21:48

## 2020-12-09 RX ADMIN — DEXAMETHASONE SODIUM PHOSPHATE 10 MG: 4 INJECTION, SOLUTION INTRAMUSCULAR; INTRAVENOUS at 20:58

## 2020-12-09 RX ADMIN — Medication 3 MG: at 21:42

## 2020-12-09 RX ADMIN — PHENYLEPHRINE HYDROCHLORIDE 100 MCG: 10 INJECTION INTRAVENOUS at 21:15

## 2020-12-09 RX ADMIN — HYDROMORPHONE HYDROCHLORIDE 0.5 MG: 2 INJECTION INTRAMUSCULAR; INTRAVENOUS; SUBCUTANEOUS at 22:11

## 2020-12-09 RX ADMIN — SODIUM CHLORIDE, SODIUM LACTATE, POTASSIUM CHLORIDE, AND CALCIUM CHLORIDE 150 ML/HR: 600; 310; 30; 20 INJECTION, SOLUTION INTRAVENOUS at 18:46

## 2020-12-09 RX ADMIN — ONDANSETRON 4 MG: 2 INJECTION INTRAMUSCULAR; INTRAVENOUS at 21:34

## 2020-12-09 RX ADMIN — PHENYLEPHRINE HYDROCHLORIDE 100 MCG: 10 INJECTION INTRAVENOUS at 21:48

## 2020-12-09 NOTE — ANESTHESIA PREPROCEDURE EVALUATION
Anesthetic History     History of awareness of surgery under anesthesia (during tonsilectomy and spine surgery)     Comments: Pt states that local anesthetic doesn't numb her as expected. Has had problems with that for chronic pain procedures including nerve blocks and epidurals. 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            Physical Exam    Airway  Mallampati: I  TM Distance: 4 - 6 cm  Neck ROM: normal range of motion   Mouth opening: Normal    Comments: Tongue ring Cardiovascular  Regular rate and rhythm,  S1 and S2 normal,  no murmur, click, rub, or gallop             Dental  No notable dental hx       Pulmonary  Breath sounds clear to auscultation               Abdominal  GI exam deferred       Other Findings            Anesthetic Plan    ASA: 3, emergent  Anesthesia type: general          Induction: RSI  Anesthetic plan and risks discussed with: Patient

## 2020-12-09 NOTE — ED TRIAGE NOTES
Known ectopic pregnancy to left side, 5th pregnancy and 1 live birth, [address] today confirmed. Mask on during triage.

## 2020-12-10 NOTE — PERIOP NOTES
Dr Bubba Oconnor notified of continued pain \"6\". Orders received may give additional up to  Dilaudid 1 mg IV.

## 2020-12-10 NOTE — DISCHARGE INSTRUCTIONS
INSTRUCTIONS FOLLOWING GYN LAPAROSCOPY      ACTIVITY   Limit activity today; increase activity tomorrow, but no vigorous exercise   Shower only; no tub baths   No douches, tampons or intercourse until your doctor releases you (at least 2 weeks)   May return to work or school as directed by your doctor    DIET   Clear liquids until no nausea or vomiting   Advance to regular diet as tolerated    PAIN   Expect a moderate amount of pain.  Take pain medication as directed by your doctor. If no prescription for pain is sent home with you, take the appropriate dose of your commonly used pain medication.  Call you doctor if pain is NOT relieved by medication.  DO NOT take aspirin or blood thinners until directed by your doctor. DRESSING CARE    Surgical sites on abdomen with surgical glue, may reinforce with gauze pads as necessary. FOLLOW PHONE [address] will be made by nursing staff.  If you have any problems or concerns, call your doctor as needed. CALL YOUR DOCTOR IF   Excessive bleeding that does not stop after holding mild pressure over the area for 15 minutes   You soak a pad in an hour   Temperature of 101°F or above   Green or yellow, smelly drainage or discharge   You are unable to urinate by bedtime   Nausea and vomiting that does not stop by bedtime    AFTER ANESTHESIA   For the next 24 hours: DO NOT Drive, Drink alcoholic beverages, or Make important decisions.  Be aware of dizziness following anesthesia and while taking pain medication.  Plan to stay tonight within 1 hours drive of the hospital.    MEDICATIONS:  Continue home medications as previously prescribed with the following changes: New Prescription: Dilaudid 2 Mg tablet, 1 tablet every 6 hours as needed for pain, for up to 3 days. Maximum daily amount 8 mg. APPOINTMENT DATE/TIME: Call office for follow-up appointment to be seen in 1-2 weeks or sooner if needed.     Your doctors phone number: [phone]

## 2020-12-10 NOTE — ANESTHESIA POSTPROCEDURE EVALUATION
Procedure(s):  LAPAROSCOPY GYN DIAGNOSTIC; REMOVAL OF LEFT OVIDUCTAL PREGNANCY.     general    Anesthesia Post Evaluation      Multimodal analgesia: multimodal analgesia used between 6 hours prior to anesthesia start to PACU discharge  Patient location during evaluation: bedside  Patient participation: complete - patient participated  Level of consciousness: responsive to verbal stimuli  Pain management: adequate  Airway patency: patent  Anesthetic complications: no  Cardiovascular status: hemodynamically stable  Respiratory status: spontaneous ventilation  Hydration status: stable    Final Post Anesthesia Temperature Assessment:  Normothermia (36.0-37.5 degrees C)      INITIAL Post-op Vital signs:   Vitals Value Taken Time   /68 12/9/2020 10:19 PM   Temp 36.4 °C (97.5 °F) 12/9/2020 10:02 PM   Pulse 79 12/9/2020 10:19 PM   Resp 22 12/9/2020 10:19 PM   SpO2 100 % 12/9/2020 10:19 PM

## 2020-12-10 NOTE — ED NOTES
Report taken from [facility]. Assumed patient care, patient on monitor waiting to go to OR.

## 2020-12-10 NOTE — BRIEF OP NOTE
Brief Postoperative Note    Patient: Coral Choi  YOB: 1985  MRN: [id number]    Date of Procedure: 12/9/2020     Pre-Op Diagnosis: ectopic pregnancy    Post-Op Diagnosis: Same as preoperative diagnosis. Procedure(s):  LAPAROSCOPY GYN DIAGNOSTIC; REMOVAL OF LEFT OVIDUCTAL PREGNANCY    Surgeon(s):  MD Rosmery Jioner MD    Surgical Assistant: Kristina Mares    Anesthesia: General     Estimated Blood Loss (mL): Minimal    Complications:  Other: morbid obesity, pelvic adhesions    Specimens:   ID Type Source Tests Collected by Time Destination   1 : left oviductal pregnancy Preservative   Khloe Hyde MD 12/9/2020 2133 Pathology        Implants: * No implants in log *    Drains: * No LDAs found *    Findings: left oviductal ectopic pregnany    Electronically Signed by Sathish Cardona MD on 12/9/2020 at 10:22 PM

## 2020-12-10 NOTE — ADDENDUM NOTE
Addendum  created 12/10/20 0809 by Fertile PELON Kaplan    Flowsheet accepted, Intraprocedure Flowsheets edited

## 2020-12-23 NOTE — OP NOTES
[place]  OPERATIVE REPORT    Name:  Jean Apple  MR#:  [id number]  :  1985  ACCOUNT #:  [de-identified]  DATE OF SERVICE:  2020    PREOPERATIVE DIAGNOSIS:  The patient diagnosed through emergency room with ruptured ectopic actively bleeding from the left side. POSTOPERATIVE DIAGNOSES:  The patient diagnosed through emergency room with ruptured ectopic actively bleeding from the left side with removal of left oviductal pregnancy and left fallopian tube, lysis of adhesions. PROCEDURE PERFORMED:  Diagnostic laparoscopy with removal of left oviductal pregnancy. SURGEON:  Enmanuel Brink MD    ASSISTANT:  Tod Cordon MD    ANESTHESIA:  General.    COMPLICATIONS:  Morbid obesity and pelvic adhesions. SPECIMENS REMOVED:  Left fallopian tube with oviductal pregnancy. IMPLANTS:  None. ESTIMATED BLOOD LOSS:  Minimal.    DRAINS:  None. FINDINGS:  Left oviductal ectopic pregnancy. PROCEDURE:  After informed consent, the patient was taken to the operating room and given general anesthesia. She was prepped and draped in the usual sterile fashion in the dorsal lithotomy position. Examination under anesthesia was carried out. Weighted speculum was placed in the vagina. Tenaculum was used to grasp the anterior lip of the cervix. The Zapata cannula was then used for cervical and uterine manipulation, this was placed and the weighted speculum was removed. The laparoscopy was begun by injecting the infraumbilical area with dilute Marcaine. A #11 blade was used to make a 5 mm transverse incision. Through this, the Veress needle was passed into the abdominal cavity, and approximately 3.5 liters of CO2 was infiltrated. The Veress needle was removed. A 5 mm disposable trocar was placed through this incision.   The laparoscope was placed through the sleeve, the patient was placed in Trendelenburg and the uterus was elevated, and there was approximately anywhere from 100-200 mL of blood, which was coagulated in the pelvis and abdomen. A second puncture was then made through the left lower quadrant, a 10 mm port was placed and a 5 mm port placed in the right lower quadrant, these were placed under direct visualization using Marcaine. Through these two ports, the tripolar device as well as manipulation devices were passed back and forth. The irrigation was carried out first to evaluate the pelvis. The old blood was irrigated and removed away. There were numerous adhesions, most of these were on the right side and we had to manipulate around these. This did complicate the surgery as well as her obesity. This became very tedious trying to evaluate the anatomy. The right fallopian tube and ovary were normal.  The uterus was normal except for there was some adhesions from the bladder to the uterus from prior . The left ovary and tube were then identified after irrigation. The left oviductal pregnancy was present. It was bleeding and ruptured and it was necessary to remove the entire tube. At this time, the grasping device was used to pull the fallopian tube away from the ovary, there were multiple adhesions here. This had to be teased away so as not to damage the ovary. The gyrus device was then used to cauterize and cut the mesosalpinx away from the ovary. The tube was then cauterized and transected away from its attachments to the left anterior uterus. Once the fallopian tube with the ectopic pregnancy was removed, the Endobag was placed through the left lower quadrant, the specimen was placed in the bag and this was retrieved, brought out of the patient. We did have to remove the trocar to do this, and the trocar was placed back in without any difficulty. Copious irrigation was then carried out. There were still quite a lot of omental adhesions on the right side, these were not addressed at this time of surgery.   After final irrigation, the old blood had been removed, there was virtually no new bleeding that occurred during the actual surgery. Tissue was taken before and after, there was no damage to the bowel. At this time, the CO2 was allowed to escape, there was no bleeding and the patient was placed out of Trendelenburg. The ports were then closed at the skin layer with 4-0 Vicryl in a subcuticular stitch and reinforced with Dermabond. The Zapata cannula was removed. Counts were correct x2, the patient was awakened and taken to recovery room in stable condition.       Consuelo Scott MD      [id number]  D:  12/23/2020 12:32  T:  12/23/2020 15:45  JOB #:  [id number]

## 2021-08-24 NOTE — PROGRESS NOTES
Dr. Nichols Yamileth updated continue with cytotec induction until cervical change. Dr. Nicole Deluca at bedside discussing EKG results and plan of care. Patient/ mother verbalized understanding. Mother states daughter has history of WPW.

## 2022-03-19 PROBLEM — O20.0 THREATENED ABORTION: Status: ACTIVE | Noted: 2019-10-21

## 2022-03-19 PROBLEM — Z83.49 FAMILY HISTORY OF MTHFR DEFICIENCY: Status: ACTIVE | Noted: 2018-05-21

## 2022-03-19 PROBLEM — E66.01 OBESITY, MORBID (HCC): Status: ACTIVE | Noted: 2018-12-04

## 2022-03-19 PROBLEM — Z71.89 MEDICATION CARE PLAN DISCUSSED WITH PATIENT: Status: ACTIVE | Noted: 2019-10-09

## 2022-03-19 PROBLEM — O00.90 ECTOPIC PREGNANCY WITHOUT INTRAUTERINE PREGNANCY: Status: ACTIVE | Noted: 2020-12-09

## 2023-03-27 ENCOUNTER — TELEPHONE (OUTPATIENT)
Dept: OBGYN CLINIC | Age: 38
End: 2023-03-27

## 2023-03-27 DIAGNOSIS — N96 HISTORY OF MULTIPLE MISCARRIAGES: Primary | ICD-10-CM

## 2023-03-27 NOTE — TELEPHONE ENCOUNTER
Patient called stating she had a + UPT. She has a hx of multiple miscarriages. She ask if she needs to start progesterone. Patient advised we normally bring in to check these levels. She is given appointment. No current complaints.     Orders Placed This Encounter   Procedures    HCG, Quantitative, Pregnancy     Standing Status:   Future     Standing Expiration Date:   3/29/2023    Progesterone     Standing Status:   Future     Standing Expiration Date:   3/29/2023    HCG, Quantitative, Pregnancy     Standing Status:   Future     Standing Expiration Date:   3/31/2023

## 2023-03-29 ENCOUNTER — TELEPHONE (OUTPATIENT)
Dept: OBGYN CLINIC | Age: 38
End: 2023-03-29

## 2023-03-29 NOTE — TELEPHONE ENCOUNTER
Pt called stating that she was scheduled to come in for serial quants this week but started having heavy vaginal bleeding with passage of clots. Admits to mild cramping. Has a hx of ectopic and states this is nothing like that. Pt will keep scheduled quant appt for Friday to see if any other follow up is needed but will cancel appt today for blood work. Pt advised to call back in the meantime if sxs worsen or with any questions/concerns. Pt v/u. All questions answered.

## 2023-03-31 ENCOUNTER — NURSE ONLY (OUTPATIENT)
Dept: OBGYN CLINIC | Age: 38
End: 2023-03-31

## 2023-03-31 DIAGNOSIS — N96 HISTORY OF MULTIPLE MISCARRIAGES: ICD-10-CM

## 2023-04-03 ENCOUNTER — TELEPHONE (OUTPATIENT)
Dept: OBGYN CLINIC | Age: 38
End: 2023-04-03

## 2023-04-03 LAB — HCG SERPL-ACNC: <1 MIU/ML (ref 0–6)

## 2023-04-03 NOTE — TELEPHONE ENCOUNTER
----- Message from CHUCKIE Wagner CNP sent at 4/3/2023  2:46 PM EDT -----  I'm not sure what just happened- her results in my in basket only displayed the 278 (which was 2 years ago). .. the neg never even came through?  No need to come back for repeat though  ----- Message -----  From: Benji Birmingham  Sent: 4/3/2023   2:40 PM EDT  To: CHUCKIE Wagner CNP    Her hcg was negative?  ----- Message -----  From: CHUCKIE Wagner CNP  Sent: 4/3/2023   2:33 PM EDT  To: Bret Hadley    Awaiting progesterone and repeat quant

## 2023-12-11 ENCOUNTER — OFFICE VISIT (OUTPATIENT)
Dept: OBGYN CLINIC | Age: 38
End: 2023-12-11
Payer: COMMERCIAL

## 2023-12-11 VITALS — BODY MASS INDEX: 34.89 KG/M2 | HEIGHT: 61 IN | WEIGHT: 184.8 LBS

## 2023-12-11 DIAGNOSIS — N63.10 MASS OF RIGHT BREAST, UNSPECIFIED QUADRANT: ICD-10-CM

## 2023-12-11 DIAGNOSIS — Z32.01 PREGNANCY TEST POSITIVE: Primary | ICD-10-CM

## 2023-12-11 LAB
HCG SERPL-ACNC: 35 MIU/ML (ref 0–6)
HCG, PREGNANCY, URINE, POC: POSITIVE
VALID INTERNAL CONTROL, POC: YES

## 2023-12-11 PROCEDURE — 81025 URINE PREGNANCY TEST: CPT | Performed by: OBSTETRICS & GYNECOLOGY

## 2023-12-11 PROCEDURE — 99214 OFFICE O/P EST MOD 30 MIN: CPT | Performed by: OBSTETRICS & GYNECOLOGY

## 2023-12-11 RX ORDER — LABETALOL 200 MG/1
200 TABLET, FILM COATED ORAL 2 TIMES DAILY
COMMUNITY
Start: 2023-11-16

## 2023-12-11 RX ORDER — CARISOPRODOL 350 MG/1
TABLET ORAL
COMMUNITY
Start: 2017-05-26

## 2023-12-11 RX ORDER — CHLORTHALIDONE 25 MG/1
TABLET ORAL
COMMUNITY
Start: 2023-11-07

## 2023-12-11 NOTE — PROGRESS NOTES
Pao Mcdaniel  is a 41 y.o. female, [id number]. Patient's last menstrual period was 2023 (approximate). , who is being seen for positive pregnancy test this morning at home. Last normal period was on 10/21/23. Had some spotting on 23 that lasted a few days and was red in color. +UPT in the office today. She is taking a peptide via walter enciso and has lost about 70 lbs. There is no data to support this. Will hold this for now. She is not taking mounjaro. Can take labetalol. Breast problem:   11/10/23 woke up with a lump in the right  breast. Not painful. No nipple discharge. Benign mammogram on 23. HISTORY:    OB History          6    Para   1    Term   1       0    AB   5    Living   1         SAB   4    IAB        Ectopic   1    Molar        Multiple        Live Births   1              GYN History         Sexual History:   Social History     Substance and Sexual Activity   Sexual Activity Yes    Partners: Male    Birth control/protection: None          has a past medical history of Arthritis, Bowel trouble, Calculus of kidney, Chronic pain, Endometriosis, Fibroids, Fractures, Gestational hypertension, Headache, Hypertension affecting pregnancy in third trimester, Hypothyroid, Infertility, female, PCOS (polycystic ovarian syndrome), Pneumonia, and Polycystic disease, ovaries.  .    Past Surgical History:   Procedure Laterality Date    BREAST BIOPSY       SECTION      x1    COLONOSCOPY      HEENT      tonsils    LUMBAR DISCECTOMY      L5-S1    PELVIC LAPAROSCOPY  2020    SALPINGECTOMY      for ectopic pregnancy    TONSILLECTOMY      WISDOM TOOTH EXTRACTION         Her current meds are:    Current Outpatient Medications:     labetalol (NORMODYNE) 200 MG tablet, Take 1 tablet by mouth 2 times daily, Disp: , Rfl:     chlorthalidone (HYGROTON) 25 MG tablet, TAKE 1/2 TABS BY MOUTH ONCE A DAY FOR HIGH BLOOD PRESSURE., Disp: , Rfl:     carisoprodol (SOMA) 350 MG

## 2023-12-12 DIAGNOSIS — Z32.01 PREGNANCY TEST POSITIVE: Primary | ICD-10-CM

## 2023-12-14 ENCOUNTER — TELEPHONE (OUTPATIENT)
Dept: OBGYN CLINIC | Age: 38
End: 2023-12-14

## 2023-12-14 RX ORDER — PROGESTERONE 200 MG/1
200 CAPSULE ORAL NIGHTLY
Qty: 30 CAPSULE | Refills: 3 | Status: SHIPPED | OUTPATIENT
Start: 2023-12-14

## 2023-12-14 NOTE — TELEPHONE ENCOUNTER
----- Message from Zuly Robert, DO sent at 12/14/2023  8:38 AM EST -----  Quant jalen appropriately but you are barely pregnant. Congratulations. Lets do 200mg of progesterone daily for first trimester support. Lets do an us in about 10 days or so. Maybe will see gest sac. Amanophilus Olszewski will you schedule this. I will send in progesterone.

## 2023-12-14 NOTE — TELEPHONE ENCOUNTER
Patient informed of Dr. Andrew Valdes recommendations. She has already picked up her RX. She is given appt in approx 10 days for eob US and recheck with Dr. Andrew Valdes. Call with any concerns before then. All questions answered, patient v/u.

## 2023-12-22 NOTE — PROGRESS NOTES
Lui Ortega  is a 41 y.o. female, [id number]. Patient's last menstrual period was 11/16/2023 (approximate). , who is being seen for TAB ultrasound. Patient reports bleeding on Thursday and clots on Friday to current. Her quant was 8 on 12/21 and she is actively bleeding. Extremely rare chance of ectopic with this picture. She wanted to have us today with continuing to have rlq pain. She saw preg in April and had recurrent pregnancy loss workup /chromosomes /full workup and all neg with hx of ectopic and recurrent preg loss. She has a sinus infection now. Will give antibiotic. She wanted to make sure her order was in for breast imaging at [facility]. She is accompanied by her husbsand. Her us is stable and lining is down to 5mm. HCG Quant on 12/11/2023: 35   HCG Quant on 12/13/2023: 97  HCG Quant on 12/21/2023: 8    Today's ultrasound findings:   GYN U/S SECONDARY TO RLQ PAIN, HCG DROPPED TO 8.  CX APPEARS WNL  UTERUS ANTEVERTED, HETEROGENOUS AND ENLARGED WITH STABLE FIBROIDS (LAST MEASURED 6 DAYS AGO). F1 RT LAT WITH CALCS  F2 RT FUND PED, CALCIFIED  F3 LT POST IM  ENDO= 5.2 MM, NO INTRACAVITY MASSES VISUALIZED  ROV VISUALIZED WITH MULTIPLE FOLLICLES AND INCREASED VOLUME, C/W PCOS  LOV VISUALIZED WITH MULTIPLE FOLLICLES AND INCREASED VOLUME, C/W PCOS  NO ADN MASSES OR FREE FLUID VISUALIZED. Patient was seen by Rosy Prince on 12/21/2023:  Roger Jacob is a 41 y.o. [id number] being seen for vaginal bleeding and cramping in early pregnancy. States LMP she believes was 11/16/23, but H/O PCOS so cycles irregular. Cramping started yesterday, then began having RLQ pain this morning. States woke up this morning with bright red, slightly more than spotting with wiping, but only noticed this AM. History of chronic HTN for which she takes labetalol. Has also been using PV prometrium since quants Wednesday of last week. Had appropriate rise in quants but still very low.  H/o hypothyroidism for

## 2023-12-27 ENCOUNTER — OFFICE VISIT (OUTPATIENT)
Dept: OBGYN CLINIC | Age: 38
End: 2023-12-27
Payer: COMMERCIAL

## 2023-12-27 VITALS — HEIGHT: 61 IN | WEIGHT: 180.6 LBS | BODY MASS INDEX: 34.1 KG/M2

## 2023-12-27 DIAGNOSIS — O36.80X0 ENCOUNTER TO DETERMINE FETAL VIABILITY OF PREGNANCY, SINGLE OR UNSPECIFIED FETUS: ICD-10-CM

## 2023-12-27 DIAGNOSIS — R10.31 ABDOMINAL PAIN, RLQ: Primary | ICD-10-CM

## 2023-12-27 DIAGNOSIS — O03.9 SAB (SPONTANEOUS ABORTION): ICD-10-CM

## 2023-12-27 LAB — HCG SERPL-ACNC: <1 MIU/ML (ref 0–6)

## 2023-12-27 PROCEDURE — 76830 TRANSVAGINAL US NON-OB: CPT | Performed by: OBSTETRICS & GYNECOLOGY

## 2023-12-27 PROCEDURE — 99214 OFFICE O/P EST MOD 30 MIN: CPT | Performed by: OBSTETRICS & GYNECOLOGY

## 2023-12-27 RX ORDER — AZITHROMYCIN 250 MG/1
TABLET, FILM COATED ORAL
Qty: 6 TABLET | Refills: 1 | Status: SHIPPED | OUTPATIENT
Start: 2023-12-27

## 2024-01-02 ENCOUNTER — TELEPHONE (OUTPATIENT)
Dept: OBGYN CLINIC | Age: 39
End: 2024-01-02

## 2024-02-06 SDOH — ECONOMIC STABILITY: INCOME INSECURITY: HOW HARD IS IT FOR YOU TO PAY FOR THE VERY BASICS LIKE FOOD, HOUSING, MEDICAL CARE, AND HEATING?: NOT HARD AT ALL

## 2024-02-06 SDOH — ECONOMIC STABILITY: TRANSPORTATION INSECURITY
IN THE PAST 12 MONTHS, HAS LACK OF TRANSPORTATION KEPT YOU FROM MEETINGS, WORK, OR FROM GETTING THINGS NEEDED FOR DAILY LIVING?: NO

## 2024-02-06 SDOH — ECONOMIC STABILITY: HOUSING INSECURITY
IN THE LAST 12 MONTHS, WAS THERE A TIME WHEN YOU DID NOT HAVE A STEADY PLACE TO SLEEP OR SLEPT IN A SHELTER (INCLUDING NOW)?: NO

## 2024-02-06 SDOH — ECONOMIC STABILITY: FOOD INSECURITY: WITHIN THE PAST 12 MONTHS, THE FOOD YOU BOUGHT JUST DIDN'T LAST AND YOU DIDN'T HAVE MONEY TO GET MORE.: NEVER TRUE

## 2024-02-06 SDOH — ECONOMIC STABILITY: FOOD INSECURITY: WITHIN THE PAST 12 MONTHS, YOU WORRIED THAT YOUR FOOD WOULD RUN OUT BEFORE YOU GOT MONEY TO BUY MORE.: NEVER TRUE

## 2024-02-07 ENCOUNTER — OFFICE VISIT (OUTPATIENT)
Dept: OBGYN CLINIC | Age: 39
End: 2024-02-07
Payer: COMMERCIAL

## 2024-02-07 VITALS — WEIGHT: 169.9 LBS | HEIGHT: 61 IN | BODY MASS INDEX: 32.08 KG/M2

## 2024-02-07 DIAGNOSIS — Z01.419 WELL WOMAN EXAM: Primary | ICD-10-CM

## 2024-02-07 DIAGNOSIS — Z11.51 SCREENING FOR HUMAN PAPILLOMAVIRUS (HPV): ICD-10-CM

## 2024-02-07 DIAGNOSIS — Z12.4 SCREENING FOR CERVICAL CANCER: ICD-10-CM

## 2024-02-07 DIAGNOSIS — Z13.89 SCREENING FOR GENITOURINARY CONDITION: ICD-10-CM

## 2024-02-07 DIAGNOSIS — Z01.419 WELL WOMAN EXAM: ICD-10-CM

## 2024-02-07 LAB
BILIRUBIN, URINE, POC: NEGATIVE
BLOOD URINE, POC: NEGATIVE
GLUCOSE URINE, POC: NEGATIVE
KETONES, URINE, POC: 250
LEUKOCYTE ESTERASE, URINE, POC: NEGATIVE
NITRITE, URINE, POC: NEGATIVE
PH, URINE, POC: 6 (ref 4.6–8)
PROTEIN,URINE, POC: NEGATIVE
SPECIFIC GRAVITY, URINE, POC: 1.03 (ref 1–1.03)
URINALYSIS CLARITY, POC: CLEAR
URINALYSIS COLOR, POC: YELLOW
UROBILINOGEN, POC: ABNORMAL

## 2024-02-07 PROCEDURE — 99395 PREV VISIT EST AGE 18-39: CPT | Performed by: OBSTETRICS & GYNECOLOGY

## 2024-02-07 PROCEDURE — 81002 URINALYSIS NONAUTO W/O SCOPE: CPT | Performed by: OBSTETRICS & GYNECOLOGY

## 2024-02-07 NOTE — PROGRESS NOTES
HPI:  Ms. Aguilar is a 38 y.o. female  who is here today for a well woman exam. She complains of nothing. She is taking Trizepatide with moujauro -just restarted.  Left tube closed.  Right tube patent when she saw preg 1.5 years ago.  Heterozygote mthfr.  She had a complete workup.  Had an us after miscarriage in December.  3 fibroids with largest one being 3cm.  Hx of early quant miscarriages / 1 ectopic.         Date Performed Result   PAP 2018 Negative. HPV Negative. Std's negative.    Mammogram NA NA   Colonoscopy NA NA   Dexa NA NA       OB History          7    Para   1    Term   1       0    AB   6    Living   1         SAB   5    IAB        Ectopic   1    Molar        Multiple        Live Births   1            6 yo son   GYN History     Patient's last menstrual period was 01/15/2024 (exact date). Cycle Length 28 Lasting 7  negative dysmenorrhea; negative postcoital bleeding        Past Medical History:   Diagnosis Date    Arthritis     joint pain to lumber nerve damage lft side    Bowel trouble     Calculus of kidney     possible kidney stones    Chronic pain     Endometriosis     Fibroids     Fractures     arms and hands    Gestational hypertension     Headache     Hypertension affecting pregnancy in third trimester 2018    Hypothyroid     Infertility, female     PCOS (polycystic ovarian syndrome)     Pneumonia     Polycystic disease, ovaries      Past Surgical History:   Procedure Laterality Date    BREAST BIOPSY       SECTION      x1    COLONOSCOPY      HEENT      tonsils    LUMBAR DISCECTOMY      L5-S1    PELVIC LAPAROSCOPY  2020    SALPINGECTOMY      for ectopic pregnancy    TONSILLECTOMY      WISDOM TOOTH EXTRACTION         Allergies   Allergen Reactions    Latex Hives     All rubbers and adhesives     Egg White Anaphylaxis    Egg Yolk Anaphylaxis    Shellfish Allergy Anaphylaxis    Adhesive Tape Other (See Comments)    Amoxicillin Hives and Swelling

## 2024-02-14 LAB
COLLECTION METHOD: NORMAL
CYTOLOGIST CVX/VAG CYTO: NORMAL
CYTOLOGY CVX/VAG DOC THIN PREP: NORMAL
DATE OF LMP: NORMAL
HPV APTIMA: NEGATIVE
Lab: NORMAL
PAP SOURCE: NORMAL
PATH REPORT.FINAL DX SPEC: NORMAL
STAT OF ADQ CVX/VAG CYTO-IMP: NORMAL

## 2024-04-30 ENCOUNTER — NURSE ONLY (OUTPATIENT)
Dept: OBGYN CLINIC | Age: 39
End: 2024-04-30

## 2024-04-30 DIAGNOSIS — Z32.01 PREGNANCY TEST POSITIVE: Primary | ICD-10-CM

## 2024-04-30 DIAGNOSIS — Z32.01 PREGNANCY TEST POSITIVE: ICD-10-CM

## 2024-04-30 LAB
HCG SERPL-ACNC: 375 MIU/ML
PROGEST SERPL-MCNC: 10.3 NG/ML

## 2024-04-30 NOTE — PROGRESS NOTES
Orders Placed This Encounter    HCG, Quantitative, Pregnancy     Standing Status:   Future     Standing Expiration Date:   4/30/2025    Progesterone     Standing Status:   Future     Standing Expiration Date:   4/30/2025

## 2024-05-02 ENCOUNTER — NURSE ONLY (OUTPATIENT)
Dept: OBGYN CLINIC | Age: 39
End: 2024-05-02

## 2024-05-02 DIAGNOSIS — Z32.01 PREGNANCY TEST POSITIVE: ICD-10-CM

## 2024-05-02 DIAGNOSIS — Z32.01 PREGNANCY TEST POSITIVE: Primary | ICD-10-CM

## 2024-05-02 LAB — HCG SERPL-ACNC: 312 MIU/ML

## 2024-05-02 NOTE — PROGRESS NOTES
Orders Placed This Encounter    HCG, Quantitative, Pregnancy     Standing Status:   Future     Standing Expiration Date:   5/2/2025

## 2024-05-21 ENCOUNTER — NURSE ONLY (OUTPATIENT)
Dept: OBGYN CLINIC | Age: 39
End: 2024-05-21

## 2024-05-21 DIAGNOSIS — O03.9 SAB (SPONTANEOUS ABORTION): Primary | ICD-10-CM

## 2024-05-21 DIAGNOSIS — O03.9 SAB (SPONTANEOUS ABORTION): ICD-10-CM

## 2024-05-21 LAB — HCG SERPL-ACNC: <1 MIU/ML

## 2025-02-03 NOTE — DISCHARGE INSTRUCTIONS
Discharge instruction to follow: Activity: Pelvis rest for 6 weeks     No heavy lifting over 15 lbs for 2 weeks     No driving for 2 weeks     No push/pull motion such as sweeping or vacuuming for 2 weeks     No tub baths for 6 weeks     section keep incision clean and dry, may shower as normal with soap and water. Inspect incision every day for signs of infection listed below. Continue to use nikky-bottle with every void or bowel movement until comfortable stopping. Change sanitary pad after each urination or bowel movement. Call MD for the following:      Fever over 101 F; pain not relieved by medication; foul smelling vaginal discharge or increase in vaginal bleeding. Redness, swelling, or drainage from  incision. Take medication as prescribed. Follow up with MD as order. DISCHARGE SUMMARY from Nurse    What to do at Home:  Recommended activity: Activity as tolerated,     *  Please give a list of your current medications to your Primary Care Provider. *  Please update this list whenever your medications are discontinued, doses are      changed, or new medications (including over-the-counter products) are added. *  Please carry medication information at all times in case of emergency situations. These are general instructions for a healthy lifestyle:    No smoking/ No tobacco products/ Avoid exposure to second hand smoke  Surgeon General's Warning:  Quitting smoking now greatly reduces serious risk to your health.     Obesity, smoking, and sedentary lifestyle greatly increases your risk for illness    A healthy diet, regular physical exercise & weight monitoring are important for maintaining a healthy lifestyle    You may be retaining fluid if you have a history of heart failure or if you experience any of the following symptoms:  Weight gain of 3 pounds or more overnight or 5 pounds in a week, increased swelling in our hands or feet or shortness of breath while lying flat in TREATMENT PLAN NEEDED   bed.  Please call your doctor as soon as you notice any of these symptoms; do not wait until your next office visit. Recognize signs and symptoms of STROKE:    F-face looks uneven    A-arms unable to move or move unevenly    S-speech slurred or non-existent    T-time-call 911 as soon as signs and symptoms begin-DO NOT go       Back to bed or wait to see if you get better-TIME IS BRAIN. Warning Signs of HEART ATTACK     Call 911 if you have these symptoms:   Chest discomfort. Most heart attacks involve discomfort in the center of the chest that lasts more than a few minutes, or that goes away and comes back. It can feel like uncomfortable pressure, squeezing, fullness, or pain.  Discomfort in other areas of the upper body. Symptoms can include pain or discomfort in one or both arms, the back, neck, jaw, or stomach.  Shortness of breath with or without chest discomfort.  Other signs may include breaking out in a cold sweat, nausea, or lightheadedness. Don't wait more than five minutes to call 911 - MINUTES MATTER! Fast action can save your life. Calling 911 is almost always the fastest way to get lifesaving treatment. Emergency Medical Services staff can begin treatment when they arrive -- up to an hour sooner than if someone gets to the hospital by car. The discharge information has been reviewed with the patient. The patient verbalized understanding. Discharge medications reviewed with the patient and appropriate educational materials and side effects teaching were provided. ___________________________________________________________________________________________________________________________________        Section: What to Expect at Home  Your Recovery    A  section, or , is surgery to deliver your baby through a cut, called an incision, that the doctor makes in your lower belly and uterus.   You may have some pain in your lower belly and need pain medicine for 1 to 2 weeks. You can expect some vaginal bleeding for several weeks. You will probably need about 6 weeks to fully recover. It is important to take it easy while the incision is healing. Avoid heavy lifting, strenuous activities, or exercises that strain the belly muscles while you are recovering. Ask a family member or friend for help with housework, cooking, and shopping. This care sheet gives you a general idea about how long it will take for you to recover. But each person recovers at a different pace. Follow the steps below to get better as quickly as possible. How can you care for yourself at home? Activity    · Rest when you feel tired. Getting enough sleep will help you recover.     · Try to walk each day. Start by walking a little more than you did the day before. Bit by bit, increase the amount you walk. Walking boosts blood flow and helps prevent pneumonia, constipation, and blood clots.     · Avoid strenuous activities, such as bicycle riding, jogging, weightlifting, and aerobic exercise, for 6 weeks or until your doctor says it is okay.     · Until your doctor says it is okay, do not lift anything heavier than your baby.     · Do not do sit-ups or other exercises that strain the belly muscles for 6 weeks or until your doctor says it is okay.     · Hold a pillow over your incision when you cough or take deep breaths. This will support your belly and decrease your pain.     · You may shower as usual. Pat the incision dry when you are done.     · You will have some vaginal bleeding. Wear sanitary pads. Do not douche or use tampons until your doctor says it is okay.     · Ask your doctor when you can drive again.     · You will probably need to take at least 6 weeks off work. It depends on the type of work you do and how you feel.     · Ask your doctor when it is okay for you to have sex. Diet    · You can eat your normal diet.  If your stomach is upset, try bland, low-fat foods like plain rice, broiled chicken, toast, and yogurt.     · Drink plenty of fluids (unless your doctor tells you not to).     · You may notice that your bowel movements are not regular right after your surgery. This is common. Try to avoid constipation and straining with bowel movements. You may want to take a fiber supplement every day. If you have not had a bowel movement after a couple of days, ask your doctor about taking a mild laxative.     · If you are breastfeeding, limit alcohol. Alcohol can cause a lack of energy and other health problems for the baby when a breastfeeding woman drinks heavily. It can also get in the way of a mom's ability to feed her baby or to care for the child in other ways. There isn't a lot of research about exactly how much alcohol can harm a baby. Having no alcohol is the safest choice for your baby. If you choose to have a drink now and then, have only one drink, and limit the number of occasions that you have a drink. Wait to breastfeed at least 2 hours after you have a drink to reduce the amount of alcohol the baby may get in the milk. Medicines    · Your doctor will tell you if and when you can restart your medicines. He or she will also give you instructions about taking any new medicines.     · If you take blood thinners, such as warfarin (Coumadin), clopidogrel (Plavix), or aspirin, be sure to talk to your doctor. He or she will tell you if and when to start taking those medicines again. Make sure that you understand exactly what your doctor wants you to do.     · Take pain medicines exactly as directed. ? If the doctor gave you a prescription medicine for pain, take it as prescribed. ? If you are not taking a prescription pain medicine, ask your doctor if you can take an over-the-counter medicine.     · If you think your pain medicine is making you sick to your stomach:  ? Take your medicine after meals (unless your doctor has told you not to). ? Ask your doctor for a different pain medicine.   · If your doctor prescribed antibiotics, take them as directed. Do not stop taking them just because you feel better. You need to take the full course of antibiotics. Incision care    · If you have strips of tape on the incision, leave the tape on for a week or until it falls off.     · Wash the area daily with warm, soapy water, and pat it dry. Don't use hydrogen peroxide or alcohol, which can slow healing. You may cover the area with a gauze bandage if it weeps or rubs against clothing. Change the bandage every day.     · Keep the area clean and dry. Other instructions    · If you breastfeed your baby, you may be more comfortable while you are healing if you place the baby so that he or she is not resting on your belly. Try tucking your baby under your arm, with his or her body along the side you will be feeding on. Support your baby's upper body with your arm. With that hand you can control your baby's head to bring his or her mouth to your breast. This is sometimes called the football hold. Follow-up care is a key part of your treatment and safety. Be sure to make and go to all appointments, and call your doctor if you are having problems. It's also a good idea to know your test results and keep a list of the medicines you take. When should you call for help? Call 911 anytime you think you may need emergency care. For example, call if:    · You passed out (lost consciousness).     · You have chest pain, are short of breath, or cough up blood.    Call your doctor now or seek immediate medical care if:    · You have pain that does not get better after you take pain medicine.     · You have severe vaginal bleeding.     · You are dizzy or lightheaded, or you feel like you may faint.     · You have new or worse pain in your belly or pelvis.     · You have loose stitches, or your incision comes open.     · You have symptoms of infection, such as:  ? Increased pain, swelling, warmth, or redness. ?  Red streaks leading from the incision. ? Pus draining from the incision. ? A fever.     · You have symptoms of a blood clot in your leg (called a deep vein thrombosis), such as:  ? Pain in your calf, back of the knee, thigh, or groin. ? Redness and swelling in your leg or groin.    Watch closely for changes in your health, and be sure to contact your doctor if:    · You do not get better as expected. Where can you learn more? Go to http://abimael-stefano.info/. Enter M806 in the search box to learn more about \" Section: What to Expect at Home. \"  Current as of: 2017  Content Version: 11.8  © 1079-5172 [employer]. Care instructions adapted under license by [employer] (which disclaims liability or warranty for this information). If you have questions about a medical condition or this instruction, always ask your healthcare professional. [address] any warranty or liability for your use of this information.
